# Patient Record
Sex: FEMALE | Race: OTHER
[De-identification: names, ages, dates, MRNs, and addresses within clinical notes are randomized per-mention and may not be internally consistent; named-entity substitution may affect disease eponyms.]

---

## 2017-04-21 ENCOUNTER — HOSPITAL ENCOUNTER (OUTPATIENT)
Dept: HOSPITAL 74 - JASU-SURG | Age: 52
Discharge: HOME | End: 2017-04-21
Attending: ORTHOPAEDIC SURGERY
Payer: COMMERCIAL

## 2017-04-21 VITALS — HEART RATE: 88 BPM | DIASTOLIC BLOOD PRESSURE: 80 MMHG | SYSTOLIC BLOOD PRESSURE: 130 MMHG

## 2017-04-21 VITALS — BODY MASS INDEX: 30.3 KG/M2

## 2017-04-21 VITALS — TEMPERATURE: 98 F

## 2017-04-21 DIAGNOSIS — M23.91: Primary | ICD-10-CM

## 2017-04-21 LAB
APPEARANCE UR: CLEAR
BILIRUB UR STRIP.AUTO-MCNC: NEGATIVE MG/DL
COLOR UR: (no result)
KETONES UR QL STRIP: NEGATIVE
LEUKOCYTE ESTERASE UR QL STRIP.AUTO: NEGATIVE
NITRITE UR QL STRIP: NEGATIVE
PH UR: 5 [PH] (ref 5–8)
PROT UR QL STRIP: NEGATIVE
PROT UR QL STRIP: NEGATIVE
RBC # UR STRIP: NEGATIVE /UL
SP GR UR: 1.02 (ref 1–1.03)
UROBILINOGEN UR STRIP-MCNC: NEGATIVE E.U./DL (ref 0.2–1)

## 2017-04-21 PROCEDURE — 0SBC4ZZ EXCISION OF RIGHT KNEE JOINT, PERCUTANEOUS ENDOSCOPIC APPROACH: ICD-10-PCS | Performed by: ORTHOPAEDIC SURGERY

## 2017-04-21 NOTE — OP
Operative Note





- Note:


Operative Date: 04/21/17


Pre-Operative Diagnosis: internal derangement right knee


Operation: arthroscopy right knee with debridement


Post-Operative Diagnosis: Same as Pre-op


Surgeon: Vaughn Reid


Anesthesia: General


Estimated Blood Loss (mls): 0


Operative Report Dictated: Yes

## 2017-04-21 NOTE — HP
Satellite [facility]





- Chief Complaint


Chief Complaint: right knee pain





- Past Medical History


Allergies/Adverse Reactions: 


 Allergies











Allergy/AdvReac Type Severity Reaction Status Date / Time


 


codeine [Codeine] Allergy Intermediate Rash Verified 04/11/17 12:50











...LMP: 01/15/17





- Current Medications


Current Medications: 


 Home Medications











 Medication  Instructions  Recorded


 


Insulin Glargine,Hum.rec.anlog 45 units SQ BID 03/25/13





[Lantus Solostar PEN -]  


 


Amlodipine Besylate [Norvasc -] 10 mg PO DAILY 12/30/14


 


Lisinopril [Prinivil -] 40 mg PO DAILY 12/30/14


 


Naproxen Sodium [Anaprox Ds] 2 tab PO BID 12/30/14


 


Fluticasone Propionate [Flovent 50 mcg IH BID 01/16/15





Diskus]  


 


Benzonatate [Tessalon Perle -] 200 mg PO TID #42 cap 12/30/16


 


Insulin Aspart [Novolog] 15 unit SQ TID 04/11/17


 


Levothyroxine [Synthroid -] 125 mcg PO DAILY 04/11/17


 


Oxycodone HCl/Acetaminophen 1 - 2 tab PO Q6H #40 tab MDD 8 04/21/17





[Percocet 5-325 mg Tablet -]  














Satellite Physical Exam





- Physical Examination


General Appearance: Well Nourished, Well Developed, Alert & Oriented x3


ENT: Clear


Lung: Normal air movement


Heart: Regular rate & rhythm


Extremities: Other (right knee- well healed surgical incision+ swelling, + ttp, 

decr rom, nvi  MRI + mt)


Neurological: Intact, Alert, Oriented





Satellite Impression/Plan





- Impression/Plan


Impression: right knee internal derangement


Operative Procedure: right knee arthroscopy


Date to be Performed: 04/21/17

## 2017-04-21 NOTE — OP
DATE OF OPERATION:  04/21/2017

 

PREOPERATIVE DIAGNOSIS:  Internal derangement, right knee.

 

POSTOPERATIVE DIAGNOSIS:  Internal derangement, right knee.

 

PROCEDURE:  Arthroscopy, right knee, with debridement.  

 

SURGICAL ATTENDING:  Vaughn Reid MD

 

ANESTHESIA:  General with LMA.

 

CLOSURE:  4-0 nylon.  

 

COMPLICATIONS:  None.

 

CONDITION:  To recovery room in stable condition.

 

DESCRIPTION OF OPERATIVE PROCEDURE:  The patient was taken to the operating room on

April 21, 2017.  General anesthesia with LMA was administered by the

anesthesiologist.  IV Kefzol was administered prophylactically prior to the case. 

Right lower extremity was prepped and draped in the usual sterile fashion. 

Superolateral, mediolateral, infrapatellar portal sites were infiltrated with 1%

Xylocaine with epinephrine.  Superolateral portal was made with a 15-blade followed

by a blunt trocar.  The knee was aspirated and inflated with cocktail 10 mL of 1%

Xylocaine, 10 mL of 0.5% Marcaine, and 20 mL of arthroscopic saline.  Medial and

lateral infrapatellar portals were then made with the 15-blade followed by a blunt

trocar.  The scope was placed in the lateral infrapatellar portal and up into the

suprapatellar pouch.  Pouch was visualized to be clean.  The medial and lateral

gutters were visualized to be clean.  The patella and trochlea, which were replaced

earlier with patellofemoral components, were probed.  They were found to be stable

throughout with no evidence of any loosening.  The patellar polyethylene was not

found to be worn more on one side or on the other.  There was no undue scar tissue

both medially and laterally.  With valgus stress underneath the medial compartment,

then, the medial meniscus was visualized and found to be intact, the medial femoral

condyle was run and found to be basically intact as was the medial tibial plateau. 

At 90 degrees, the ACL was visualized and probed, found to be intact.  There was some

anterior scar tissue running medially to laterally in the anterior aspect of the

knee.  This was debrided using the shaver.  In the figure 4 position, lateral

compartment was entered.  Lateral meniscus was visualized, probed, and found to be

intact.  Lateral femoral condyle was run and found to be intact, as was the lateral

tibial plateau.  The knee was then irrigated out with copious amounts of irrigation. 

The portals were closed using 4-0 nylon.  A sterile pressure dressing was placed over

the knee.  Patient awakened from anesthesia and transferred to recovery in stable

condition.  No complications.  Estimated blood loss negligible.

 

 

CHING [id number]

DD: 04/21/2017 12:01

DT: 04/21/2017 12:52

Job #:  [id number]

## 2018-04-02 ENCOUNTER — HOSPITAL ENCOUNTER (OUTPATIENT)
Dept: HOSPITAL 74 - FASU | Age: 53
LOS: 1 days | Discharge: HOME | End: 2018-04-03
Attending: ORTHOPAEDIC SURGERY
Payer: COMMERCIAL

## 2018-04-02 VITALS — BODY MASS INDEX: 29.9 KG/M2

## 2018-04-02 DIAGNOSIS — M22.2X1: Primary | ICD-10-CM

## 2018-04-02 PROCEDURE — 0SUC09C SUPPLEMENT RIGHT KNEE JOINT WITH LINER, PATELLAR SURFACE, OPEN APPROACH: ICD-10-PCS | Performed by: ORTHOPAEDIC SURGERY

## 2018-04-02 PROCEDURE — 0QQD0ZZ REPAIR RIGHT PATELLA, OPEN APPROACH: ICD-10-PCS | Performed by: ORTHOPAEDIC SURGERY

## 2018-04-02 RX ADMIN — INSULIN DETEMIR SCH UNITS: 100 INJECTION, SOLUTION SUBCUTANEOUS at 17:31

## 2018-04-02 RX ADMIN — INSULIN ASPART SCH UNITS: 100 INJECTION, SOLUTION INTRAVENOUS; SUBCUTANEOUS at 17:31

## 2018-04-02 RX ADMIN — CEFAZOLIN SODIUM SCH MLS/HR: 2 SOLUTION INTRAVENOUS at 20:35

## 2018-04-02 RX ADMIN — DOCUSATE SODIUM,SENNOSIDES SCH TABLET: 50; 8.6 TABLET, FILM COATED ORAL at 21:20

## 2018-04-02 RX ADMIN — INSULIN ASPART SCH: 100 INJECTION, SOLUTION INTRAVENOUS; SUBCUTANEOUS at 17:33

## 2018-04-02 NOTE — SURG
Surgery First Assist Note


First Assist: Ashlyn nAn PA-C


Date of Service: 04/02/18


Diagnosis: 





right patellofemoral pain with prosthesis


Procedure: 





Right lateral release with patelloplasty


I was present for the entirety of the operative procedure. For further detail, 

please refer to operative report.








Visit type





- Case Type


Case Type: Scheduled Admission





- Emergency


Emergency Visit: No





- New patient


This patient is new to me today: Yes


Date on this admission: 04/02/18

## 2018-04-02 NOTE — OP
Operative Note





- Note:


Operative Date: 04/02/18


Pre-Operative Diagnosis: right patellofemoral pain with prosthesis


Operation: Right knee lateral release with patelloplasty


Post-Operative Diagnosis: Same as Pre-op


Surgeon: Roger Domínguez


Assistant: Ashlyn Ann


Anesthesiologist/CRNA: Chase López


Anesthesia: Local (block)


Specimens Removed: lateral boarder of patella


Estimated Blood Loss (mls): 50


Drains & Tubes with Location: hemavac right knee


Fluid Volume Replaced (mls): 400


Operative Report Dictated: Yes

## 2018-04-03 VITALS — SYSTOLIC BLOOD PRESSURE: 138 MMHG | DIASTOLIC BLOOD PRESSURE: 65 MMHG | TEMPERATURE: 98.5 F | HEART RATE: 98 BPM

## 2018-04-03 LAB
ANION GAP SERPL CALC-SCNC: 7 MMOL/L (ref 8–16)
BUN SERPL-MCNC: 16 MG/DL (ref 7–18)
CALCIUM SERPL-MCNC: 9.2 MG/DL (ref 8.4–10.2)
CHLORIDE SERPL-SCNC: 103 MMOL/L (ref 98–107)
CO2 SERPL-SCNC: 25 MMOL/L (ref 22–28)
CREAT SERPL-MCNC: 0.5 MG/DL (ref 0.6–1.3)
DEPRECATED RDW RBC AUTO: 13.6 % (ref 11.6–15.6)
GLUCOSE SERPL-MCNC: 252 MG/DL (ref 74–106)
HCT VFR BLD CALC: 37.2 % (ref 32.4–45.2)
HGB BLD-MCNC: 13.1 GM/DL (ref 10.7–15.3)
MCH RBC QN AUTO: 31.8 PG (ref 25.7–33.7)
MCHC RBC AUTO-ENTMCNC: 35.1 G/DL (ref 32–36)
MCV RBC: 90.7 FL (ref 80–96)
PLATELET # BLD AUTO: 206 K/MM3 (ref 134–434)
PMV BLD: 11.7 FL (ref 7.5–11.1)
POTASSIUM SERPLBLD-SCNC: 4.2 MMOL/L (ref 3.5–5.1)
RBC # BLD AUTO: 4.1 M/MM3 (ref 3.6–5.2)
SODIUM SERPL-SCNC: 135 MMOL/L (ref 136–145)
WBC # BLD AUTO: 13.7 K/MM3 (ref 4–10.8)

## 2018-04-03 RX ADMIN — INSULIN DETEMIR SCH UNITS: 100 INJECTION, SOLUTION SUBCUTANEOUS at 06:44

## 2018-04-03 RX ADMIN — INSULIN ASPART SCH UNITS: 100 INJECTION, SOLUTION INTRAVENOUS; SUBCUTANEOUS at 11:38

## 2018-04-03 RX ADMIN — CEFAZOLIN SODIUM SCH MLS/HR: 2 SOLUTION INTRAVENOUS at 03:06

## 2018-04-03 RX ADMIN — INSULIN ASPART SCH UNITS: 100 INJECTION, SOLUTION INTRAVENOUS; SUBCUTANEOUS at 08:11

## 2018-04-03 RX ADMIN — DOCUSATE SODIUM,SENNOSIDES SCH: 50; 8.6 TABLET, FILM COATED ORAL at 09:31

## 2018-04-03 RX ADMIN — INSULIN ASPART SCH UNITS: 100 INJECTION, SOLUTION INTRAVENOUS; SUBCUTANEOUS at 08:12

## 2018-04-03 NOTE — PN
Progress Note (short form)





- Note


Progress Note: 





52F POD1 s/p open lateral retinacular release arthroplasty under spinal 

anesthetic with peripheral nerve blocks for post ooperative pain relief.


Pt states that pain is well controlled, reports some nausea earlier today that 

has improved.


AVSS. Motor and sensory function intact in bilateral lower extremities.


Continue current regimen.

## 2018-04-03 NOTE — OP
DATE OF OPERATION:  04/02/2018

 

PREOPERATIVE DIAGNOSIS:  Right knee patellofemoral maltracking.  

 

POSTOPERATIVE DIAGNOSIS:  Right knee patellofemoral maltracking.

 

OPERATION PERFORMED:  Right knee open lateral release and arthroplasty of the 
patella

without prosthesis.  

 

SURGEON:  Roger Domínguez MD

 

ASSISTANT:  PA.

 

TYPE OF ANESTHESIA:  General

 

DISPOSITION:  Patient was returned to the recovery room in stable condition.

 

DRAINS PLACED:  One Hemovac.

 

ESTIMATED BLOOD LOSS:  Less than 50 mL.

 

TOTAL TOURNIQUET TIME:  145 minutes.  

 

FINDINGS:  Lateral tracking patella.  

 

INDICATIONS FOR THE PROCEDURE:  Patient failed non-operative treatment for a

laterally tracking and tilted patella after a patellofemoral replacement and was

indicated for a lateral release and arthroplasty of the patella without the

prosthesis removing the lateral bone.  Preoperatively in the waiting area as 
well as

the office, I had a long discussion with the patient in English per her 
preference. 

I did discuss this with her regarding the plan, expected outcome, and the

risks, benefits, and alternatives of surgery.  The risks include, but are not 
limited

to, infection which may require future surgery and removal of her implants, 
bleeding

which may require a transfusion, damage to nerves, arteries, veins, tendons, 
muscles,

and other adjacent structures leading to possible numbness, weakness, decreased

function, and/or possible need for surgical repair.  Also discussed was the

possibility of implant loosening, stiffness, need for extensive therapy and 
need for

revision surgery for a variety of reasons.  We also discussed blood clots and 
other

medical complications.  This was discussed at length, and consent was obtained.
  We

also discussed that there is a chance that this may not resolve all her 
problems.  

 

PROCEDURE IN DETAIL:  Patient was taken to the operating room and placed on the

operating table in the supine position.  Following induction of anesthesia, a

well-padded tourniquet was placed high in the right thigh, and the right lower

extremity was prepped and draped in a sterile fashion.  Time-out was performed 
to

confirm the correct side.  We verified that side was marked and confirmed the 
correct

patient and procedure to be performed as well as that the patient received the

appropriate preoperative antibiotics, and had a compression device on the

non-operative leg.  

 

The tourniquet was elevated followed by a midline incision.  We did a lateral 

parapatellar arthrotomy and a lateral release, and using a saw, removed the

overhanging lateral patella.  The inspection of the patellar component showed 
it to

be in appropriate position as well as the trochlear component.  Both components 
were

well fixed with no excessive wear on the on the patellar component.  

 

At this point, with the full lateral release completed and

hemostasis obtained, I inspected the rest of the knee, which showed some grade 2

changes in the lateral distal femur, but the patella was tracking centrally 
with no

areas of impingement of the lateral facet on the lateral femur.  The patella 
tracked

centrally, and there was good stability, soft tissue tension, and range of 
motion.  

 

The wound was copiously irrigated.  Local anesthetic was injected.  Then, 2-0 
Vicryl

was used to close deep layer leaving the arthrotomy and lateral release open.  
We

then used 2-0 Vicryl and staples for the skin.  Deep drain had been placed.  A 
dry

sterile compressive dressing was placed on the patient, and she was returned to 
the

recovery room in stable condition.

 

WOUND CLASSIFICATION:  Clean.

 

COMPLICATIONS:  None.

 

SPECIMENS:  None.

 

 

CHING [id number]

DD: 04/02/2018 20:19

DT: 04/03/2018 08:00

Job #:  [id number]

MTDD

## 2018-04-03 NOTE — PN
Progress Note (short form)





- Note


Progress Note: 





POD#1 right knee lateral release Patient seen and examined at bedside. Patient c

/o nausea with one episode of vomiting this morning. She tolerated her diet 

last night and has been OOB to bathroom to void. She states her pain is 

controlled and she denies any CP, SOB, fever or chills.





 Vital Signs











 Period  Temp  Pulse  Resp  BP Sys/Aguilar  Pulse Ox


 


 Last 24 Hr  98.1 F-98.6 F    16-20  111-153/55-93  








 Vital Signs











Temp  98.5 F   04/03/18 06:00


 


Pulse  98 H  04/03/18 06:00


 


Resp  18   04/03/18 06:00


 


BP  138/65   04/03/18 06:00


 


Pulse Ox  97   04/03/18 07:12








 Intake & Output











 04/02/18 04/02/18 04/03/18





 11:59 23:59 11:59


 


Intake Total 400 1100 650


 


Output Total  340 


 


Balance 400 760 650


 


Weight 186 lb  


 


Intake:   


 


   400 300


 


    Normal Saline - 1,000 ml   300





    @ 75 mls/hr IV ASDIR ANNY   





    Rx#:[id number]   


 


  IVPB   100


 


  Oral  700 250


 


Output:   


 


  Drainage  100 


 


    Right Knee  60 


 


  Urine  240 


 


Other:   


 


  Voiding Method  Toilet Toilet


 


  # Unmeasured Voids   


 


    Void  1 3


 


  Height 5 ft 6 in  


 


  Body Mass Index (BMI) 29.9  


 


  Weight Measurement Method Standing Scale  








PE:


A&Ox3, NAD


unlabored resp on RA


right LE, Knee with moderated effusion appropriate to status, dressing with 

some saturation at central portion but intact. Surrounding tissue intact with 

no evidence of tracking erythema or ecchymosis. ROM from 10-40 degrees. Hemavac 

drain removed with tip fully intact.  serosanginous d/c in drain. Drain site 

clean and dry with no d/c or active bleeding, pressure dressing applied.


B/L LE compartments soft, supple and non-tender, NVID with +2 pedal pulses











Problem List





- Problems


(1) Knee pain


Assessment/Plan: 


POD #1 right knee lateral release doing well.





Plan:


1) OOB with PT today WBAT with walker


2) keep dressing clean and dry


3) DVT prophylaxis with B/l teds, scds and Aspirin 81mg BID


4) d/c home today





evaluation and plan discussed with Dr Domínguez


Code(s): M25.569 - PAIN IN UNSPECIFIED KNEE   


Qualifiers: 


   Laterality: right   Qualified Code(s): M25.561 - Pain in right knee

## 2018-10-28 ENCOUNTER — HOSPITAL ENCOUNTER (EMERGENCY)
Dept: HOSPITAL 74 - JER | Age: 53
Discharge: HOME | End: 2018-10-28
Payer: COMMERCIAL

## 2018-10-28 VITALS — DIASTOLIC BLOOD PRESSURE: 72 MMHG | HEART RATE: 76 BPM | SYSTOLIC BLOOD PRESSURE: 119 MMHG

## 2018-10-28 VITALS — BODY MASS INDEX: 29.8 KG/M2

## 2018-10-28 VITALS — TEMPERATURE: 98.5 F

## 2018-10-28 DIAGNOSIS — Z96.651: ICD-10-CM

## 2018-10-28 DIAGNOSIS — Z79.4: ICD-10-CM

## 2018-10-28 DIAGNOSIS — I25.10: ICD-10-CM

## 2018-10-28 DIAGNOSIS — I10: ICD-10-CM

## 2018-10-28 DIAGNOSIS — E11.9: ICD-10-CM

## 2018-10-28 DIAGNOSIS — Z98.61: ICD-10-CM

## 2018-10-28 DIAGNOSIS — E03.9: ICD-10-CM

## 2018-10-28 DIAGNOSIS — I25.2: ICD-10-CM

## 2018-10-28 DIAGNOSIS — M54.32: Primary | ICD-10-CM

## 2018-10-28 DIAGNOSIS — E78.00: ICD-10-CM

## 2018-10-28 LAB
APPEARANCE UR: CLEAR
BILIRUB UR STRIP.AUTO-MCNC: NEGATIVE MG/DL
COLOR UR: (no result)
KETONES UR QL STRIP: NEGATIVE
LEUKOCYTE ESTERASE UR QL STRIP.AUTO: NEGATIVE
NITRITE UR QL STRIP: NEGATIVE
PH UR: 6 [PH] (ref 5–8)
PROT UR QL STRIP: (no result)
PROT UR QL STRIP: NEGATIVE
SP GR UR: 1.03 (ref 1.01–1.03)
UROBILINOGEN UR STRIP-MCNC: NEGATIVE MG/DL (ref 0.2–1)

## 2018-10-28 PROCEDURE — 3E0233Z INTRODUCTION OF ANTI-INFLAMMATORY INTO MUSCLE, PERCUTANEOUS APPROACH: ICD-10-PCS
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
## 2019-02-05 ENCOUNTER — HOSPITAL ENCOUNTER (EMERGENCY)
Dept: HOSPITAL 74 - JER | Age: 54
Discharge: HOME | End: 2019-02-05
Payer: COMMERCIAL

## 2019-02-05 VITALS — TEMPERATURE: 97.9 F | DIASTOLIC BLOOD PRESSURE: 72 MMHG | SYSTOLIC BLOOD PRESSURE: 136 MMHG | HEART RATE: 83 BPM

## 2019-02-05 VITALS — BODY MASS INDEX: 29.8 KG/M2

## 2019-02-05 DIAGNOSIS — E03.9: ICD-10-CM

## 2019-02-05 DIAGNOSIS — Z79.4: ICD-10-CM

## 2019-02-05 DIAGNOSIS — E11.9: ICD-10-CM

## 2019-02-05 DIAGNOSIS — J40: Primary | ICD-10-CM

## 2019-02-05 DIAGNOSIS — I10: ICD-10-CM

## 2019-02-05 DIAGNOSIS — E78.00: ICD-10-CM

## 2019-03-26 ENCOUNTER — HOSPITAL ENCOUNTER (EMERGENCY)
Dept: HOSPITAL 74 - JER | Age: 54
LOS: 1 days | Discharge: HOME | End: 2019-03-27
Payer: COMMERCIAL

## 2019-03-26 VITALS — DIASTOLIC BLOOD PRESSURE: 65 MMHG | HEART RATE: 79 BPM | SYSTOLIC BLOOD PRESSURE: 154 MMHG | TEMPERATURE: 98 F

## 2019-03-26 VITALS — BODY MASS INDEX: 29.8 KG/M2

## 2019-03-26 DIAGNOSIS — Z79.4: ICD-10-CM

## 2019-03-26 DIAGNOSIS — M25.561: Primary | ICD-10-CM

## 2019-03-26 DIAGNOSIS — I25.2: ICD-10-CM

## 2019-03-26 DIAGNOSIS — E11.9: ICD-10-CM

## 2019-03-26 DIAGNOSIS — I25.10: ICD-10-CM

## 2019-03-26 DIAGNOSIS — E78.00: ICD-10-CM

## 2019-03-26 DIAGNOSIS — Z98.61: ICD-10-CM

## 2019-03-26 DIAGNOSIS — I10: ICD-10-CM

## 2019-03-26 DIAGNOSIS — Z96.651: ICD-10-CM

## 2019-03-26 PROCEDURE — 3E0233Z INTRODUCTION OF ANTI-INFLAMMATORY INTO MUSCLE, PERCUTANEOUS APPROACH: ICD-10-PCS
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
## 2019-03-27 NOTE — PDOC
*Physical Exam





- Vital Signs


 Last Vital Signs











Temp Pulse Resp BP Pulse Ox


 


 98 F   79   20   154/65   95 


 


 03/26/19 23:52  03/26/19 23:52  03/26/19 23:52  03/26/19 23:52  03/26/19 23:52














Medical Decision Making





- Medical Decision Making





03/27/19 01:41


Patient seen by the advanced practice provider under my direct supervision. 

Ancillary testing reviewed as necessary.


I agree with plan as outlined by the advanced practice provider.





*DC/Admit/Observation/Transfer


Diagnosis at time of Disposition: 


Knee pain


Qualifiers:


 Chronicity: acute Laterality: right Qualified Code(s): M25.561 - Pain in right 

knee








- Discharge Dispostion


Disposition: HOME





- Referrals


Referrals: 


Sanchez Villegas MD, MD [Primary Care Provider] - 





- Patient Instructions





- Post Discharge Activity

## 2019-09-09 ENCOUNTER — HOSPITAL ENCOUNTER (OUTPATIENT)
Dept: HOSPITAL 74 - JER | Age: 54
Setting detail: OBSERVATION
LOS: 1 days | Discharge: HOME | End: 2019-09-10
Attending: FAMILY MEDICINE | Admitting: FAMILY MEDICINE
Payer: COMMERCIAL

## 2019-09-09 VITALS — BODY MASS INDEX: 32 KG/M2

## 2019-09-09 DIAGNOSIS — Z98.61: ICD-10-CM

## 2019-09-09 DIAGNOSIS — R07.89: Primary | ICD-10-CM

## 2019-09-09 DIAGNOSIS — Z79.82: ICD-10-CM

## 2019-09-09 DIAGNOSIS — E78.5: ICD-10-CM

## 2019-09-09 DIAGNOSIS — Z79.4: ICD-10-CM

## 2019-09-09 DIAGNOSIS — E87.0: ICD-10-CM

## 2019-09-09 DIAGNOSIS — K21.9: ICD-10-CM

## 2019-09-09 DIAGNOSIS — E03.9: ICD-10-CM

## 2019-09-09 DIAGNOSIS — E11.9: ICD-10-CM

## 2019-09-09 DIAGNOSIS — E66.9: ICD-10-CM

## 2019-09-09 DIAGNOSIS — I25.2: ICD-10-CM

## 2019-09-09 DIAGNOSIS — I25.10: ICD-10-CM

## 2019-09-09 DIAGNOSIS — Z88.5: ICD-10-CM

## 2019-09-09 DIAGNOSIS — I10: ICD-10-CM

## 2019-09-09 LAB
ALBUMIN SERPL-MCNC: 4 G/DL (ref 3.4–5)
ALP SERPL-CCNC: 129 U/L (ref 45–117)
ALT SERPL-CCNC: 60 U/L (ref 13–61)
ANION GAP SERPL CALC-SCNC: 10 MMOL/L (ref 8–16)
APPEARANCE UR: CLEAR
APTT BLD: 32.3 SECONDS (ref 25.2–36.5)
AST SERPL-CCNC: 38 U/L (ref 15–37)
BASOPHILS # BLD: 1.1 % (ref 0–2)
BILIRUB SERPL-MCNC: 0.7 MG/DL (ref 0.2–1)
BILIRUB UR STRIP.AUTO-MCNC: NEGATIVE MG/DL
BNP SERPL-MCNC: 5.4 PG/ML (ref 5–125)
BUN SERPL-MCNC: 16.8 MG/DL (ref 7–18)
CALCIUM SERPL-MCNC: 9.3 MG/DL (ref 8.5–10.1)
CHLORIDE SERPL-SCNC: 111 MMOL/L (ref 98–107)
CO2 SERPL-SCNC: 28 MMOL/L (ref 21–32)
COLOR UR: YELLOW
CREAT SERPL-MCNC: 0.5 MG/DL (ref 0.55–1.3)
DEPRECATED RDW RBC AUTO: 14.2 % (ref 11.6–15.6)
EOSINOPHIL # BLD: 1.7 % (ref 0–4.5)
GLUCOSE SERPL-MCNC: 92 MG/DL (ref 74–106)
HCT VFR BLD CALC: 39.5 % (ref 32.4–45.2)
HGB BLD-MCNC: 13.5 GM/DL (ref 10.7–15.3)
INR BLD: 1 (ref 0.83–1.09)
KETONES UR QL STRIP: NEGATIVE
LEUKOCYTE ESTERASE UR QL STRIP.AUTO: NEGATIVE
LIPASE SERPL-CCNC: 136 U/L (ref 73–393)
LYMPHOCYTES # BLD: 36.9 % (ref 8–40)
MCH RBC QN AUTO: 31.4 PG (ref 25.7–33.7)
MCHC RBC AUTO-ENTMCNC: 34.1 G/DL (ref 32–36)
MCV RBC: 92.1 FL (ref 80–96)
MONOCYTES # BLD AUTO: 7.7 % (ref 3.8–10.2)
NEUTROPHILS # BLD: 52.6 % (ref 42.8–82.8)
NITRITE UR QL STRIP: NEGATIVE
PH UR: 7 [PH] (ref 5–8)
PLATELET # BLD AUTO: 191 K/MM3 (ref 134–434)
PMV BLD: 11 FL (ref 7.5–11.1)
POTASSIUM SERPLBLD-SCNC: 4.1 MMOL/L (ref 3.5–5.1)
PROT SERPL-MCNC: 7.8 G/DL (ref 6.4–8.2)
PROT UR QL STRIP: NEGATIVE
PROT UR QL STRIP: NEGATIVE
PT PNL PPP: 11.8 SEC (ref 9.7–13)
RBC # BLD AUTO: 4.28 M/MM3 (ref 3.6–5.2)
SODIUM SERPL-SCNC: 149 MMOL/L (ref 136–145)
SP GR UR: 1.03 (ref 1.01–1.03)
UROBILINOGEN UR STRIP-MCNC: 1 MG/DL (ref 0.2–1)
WBC # BLD AUTO: 6 K/MM3 (ref 4–10)

## 2019-09-09 PROCEDURE — 3E033GC INTRODUCTION OF OTHER THERAPEUTIC SUBSTANCE INTO PERIPHERAL VEIN, PERCUTANEOUS APPROACH: ICD-10-PCS | Performed by: FAMILY MEDICINE

## 2019-09-09 PROCEDURE — 3E013VG INTRODUCTION OF INSULIN INTO SUBCUTANEOUS TISSUE, PERCUTANEOUS APPROACH: ICD-10-PCS | Performed by: FAMILY MEDICINE

## 2019-09-09 PROCEDURE — 3E013GC INTRODUCTION OF OTHER THERAPEUTIC SUBSTANCE INTO SUBCUTANEOUS TISSUE, PERCUTANEOUS APPROACH: ICD-10-PCS | Performed by: FAMILY MEDICINE

## 2019-09-09 PROCEDURE — A9502 TC99M TETROFOSMIN: HCPCS

## 2019-09-09 PROCEDURE — 3E0337Z INTRODUCTION OF ELECTROLYTIC AND WATER BALANCE SUBSTANCE INTO PERIPHERAL VEIN, PERCUTANEOUS APPROACH: ICD-10-PCS | Performed by: FAMILY MEDICINE

## 2019-09-09 PROCEDURE — G0378 HOSPITAL OBSERVATION PER HR: HCPCS

## 2019-09-09 RX ADMIN — INSULIN DETEMIR SCH UNIT: 100 INJECTION, SOLUTION SUBCUTANEOUS at 09:46

## 2019-09-09 RX ADMIN — INSULIN DETEMIR SCH UNIT: 100 INJECTION, SOLUTION SUBCUTANEOUS at 21:50

## 2019-09-09 RX ADMIN — INSULIN ASPART SCH: 100 INJECTION, SOLUTION INTRAVENOUS; SUBCUTANEOUS at 12:23

## 2019-09-09 RX ADMIN — INSULIN ASPART SCH: 100 INJECTION, SOLUTION INTRAVENOUS; SUBCUTANEOUS at 06:32

## 2019-09-09 RX ADMIN — LOSARTAN POTASSIUM SCH MG: 50 TABLET, FILM COATED ORAL at 09:42

## 2019-09-09 RX ADMIN — AMLODIPINE BESYLATE SCH MG: 10 TABLET ORAL at 09:42

## 2019-09-09 RX ADMIN — INSULIN ASPART SCH UNIT: 100 INJECTION, SOLUTION INTRAVENOUS; SUBCUTANEOUS at 16:29

## 2019-09-09 RX ADMIN — HEPARIN SODIUM SCH UNIT: 5000 INJECTION, SOLUTION INTRAVENOUS; SUBCUTANEOUS at 09:42

## 2019-09-09 RX ADMIN — HEPARIN SODIUM SCH UNIT: 5000 INJECTION, SOLUTION INTRAVENOUS; SUBCUTANEOUS at 21:50

## 2019-09-09 NOTE — EKG
Test Reason : 

Blood Pressure : ***/*** mmHG

Vent. Rate : 075 BPM     Atrial Rate : 075 BPM

   P-R Int : 140 ms          QRS Dur : 094 ms

    QT Int : 420 ms       P-R-T Axes : 054 027 058 degrees

   QTc Int : 469 ms

 

NORMAL SINUS RHYTHM

NONSPECIFIC T WAVE ABNORMALITY

WHEN COMPARED WITH ECG OF 26-SEP-2016 23:02,

T WAVE VARIATION

Confirmed by MARY WALDROP MD ([id number]) on 9/9/2019 11:26:00 AM

 

Referred By:             Confirmed By:MARY WALDROP MD

## 2019-09-09 NOTE — PN
Progress Note, Physician


Chief Complaint: 





patient seen and examined complaining of chest pain on exertion 


has h/o cardiac cath about 8 yrs ago, DM,





- Current Medication List


Current Medications: 


Active Medications





Amlodipine Besylate (Norvasc -)  10 mg PO DAILY [facility]


   Last Admin: 09/09/19 09:42 Dose:  10 mg


Atorvastatin Calcium (Lipitor -)  10 mg PO HS [facility]


Heparin Sodium (Porcine) (Heparin -)  5,000 unit SQ BID [facility]


   Last Admin: 09/09/19 09:42 Dose:  5,000 unit


Insulin Aspart (Novolog Vial Sliding Scale -)  1 vial SQ TIDAC [facility]; Protocol


   Last Admin: 09/09/19 06:32 Dose:  Not Given


Insulin Detemir (Levemir Vial)  45 units SQ BID@0700,2200 [facility]


   Last Admin: 09/09/19 09:46 Dose:  45 unit


Levothyroxine Sodium (Synthroid -)  150 mcg PO DAILY@0700 [facility]


Losartan Potassium (Cozaar -)  100 mg PO DAILY [facility]


   Last Admin: 09/09/19 09:42 Dose:  100 mg











- Objective


Vital Signs: 


 Vital Signs











Temperature  98.3 F   09/09/19 08:55


 


Pulse Rate  76   09/09/19 08:55


 


Respiratory Rate  20   09/09/19 08:55


 


Blood Pressure  129/61   09/09/19 08:55


 


O2 Sat by Pulse Oximetry (%)  100   09/09/19 00:37











Constitutional: Yes: Calm


Cardiovascular: Yes: Regular Rate and Rhythm, S1, S2


Respiratory: Yes: CTA Bilaterally


Gastrointestinal: Yes: Normal Bowel Sounds, Soft


Edema: No


Neurological: Yes: Alert, Oriented


Labs: 


 CBC, BMP





 09/09/19 00:56 





 09/09/19 00:47 





 INR, PTT











INR  1.00  (0.83-1.09)   09/09/19  00:56    














Problem List





- Problems


(1) CAD (coronary artery disease)


Assessment/Plan: 


stress test 


cardiology eval


CE 2 sets negative


Code(s): I25.10 - ATHSCL HEART DISEASE OF NATIVE CORONARY ARTERY W/O ANG PCTRS 

  





(2) Diabetes


Assessment/Plan: 


insulin


bgm


hgba1c


endocrine


Code(s): E11.9 - TYPE 2 DIABETES MELLITUS WITHOUT COMPLICATIONS   


Qualifiers: 


   Diabetes mellitus type: type 2 





(3) HLD (hyperlipidemia)


Assessment/Plan: 


lipid panel


 LDL < 70


Code(s): E78.5 - HYPERLIPIDEMIA, UNSPECIFIED   





(4) HTN (hypertension)


Assessment/Plan: 


norvasc and cozaar


Code(s): I10 - ESSENTIAL (PRIMARY) HYPERTENSION   





(5) Hypothyroidism


Assessment/Plan: 


tsh noted dose increased


Code(s): E03.9 - HYPOTHYROIDISM, UNSPECIFIED

## 2019-09-09 NOTE — HP
CHIEF COMPLAINT: chest pain





PCP: Dr. Ortiz 





HISTORY OF PRESENT ILLNESS: 





54 year old female with PMHx of HTN, HLD, DM, CAD s/p MI with cath x2 (no 

stenting), GERD, and hypothyroidism arrived to  ED for chest pain x5 days. 

Patient reported her chest pain is left sided, pressure, intermittent, non-

radiating, aggravated by exertion, pain of 7/10. Patient also complain of pain 

neck, dizziness,  mild nausea without vomiting. Patient denies SOB, headache, 

no diarrhea or vomiting. 





ER course was notable for:


(1)EKG: NSR, Trops X1 negative 


(2) given Tylenol 975 mg,  mg , Nitro SL, Famotidine 20 mg, 1L IV fluids 





Recent Travel: no





PAST MEDICAL HISTORY: h/o MI, 2007 & 2011-cardiac cath X2 NO STENT, DM, HTN, HLD

, Hypothyroidism 





PAST SURGICAL HISTORY: Right knee Sx x3, Tummy Tuck 2010





Social History:


Smoking:no


Alcohol:no


Drugs:no





Family History: Mother: stroke, MI;  Sister: Pulmonary HTN 


Allergies: codeine phosphate [From Tylenol-Codeine #3] Allergy (Intermediate, 

Verified 09/09/19 00:37)


 Rash, pt tolerates percocet 








HOME MEDICATIONS:


 Home Medications











 Medication  Instructions  Recorded


 


Amlodipine Besylate [Norvasc -] 10 mg PO DAILY 12/30/14


 


Fluticasone Propionate [Flovent 50 mcg IH BID PRN 01/16/15





Diskus]  


 


Insulin Aspart [Novolog] 15 unit SQ TID 04/11/17


 


Levothyroxine [Synthroid -] 125 mcg PO DAILY 04/11/17


 


Aspirin [Ecotrin] 81 mg PO DAILY 03/29/18


 


Atorvastatin Ca [Lipitor] 10 mg PO HS 03/29/18


 


Cholecalciferol (Vitamin D3) 5,000 unit PO DAILY 03/29/18





[Vitamin D3]  


 


Ibuprofen 600 mg PO ASDIR PRN 03/29/18


 


Insulin Detemir [Levemir Flextouch] 45 unit SQ BID 03/29/18


 


Losartan Potassium 100 mg PO DAILY 03/29/18


 


oxyCODONE HCL [Roxicodone -] 5 mg PO Q4H PRN #20 tablet MDD 6 04/02/18


 


Cyclobenzaprine HCl [Flexeril -] 10 mg PO BID #10 tablet 10/28/18


 


Azithromycin 250 mg PO DAILY #6 tablet 02/05/19


 


Ibuprofen 600 mg PO QID PRN #20 tablet 03/27/19








REVIEW OF SYSTEMS


General: absent: fever, chills, generalized weakness.


HEENT: absent: sore throat, rhinorrhea, ear pain.


Cardiovascular: chest pain, absent: palpitations, syncope, diaphoresis.


Respiratory: absent: shortness of breath, cough, sputum production, hemoptysis.


Gastrointestinal: absent:  abdominal pain, nausea, vomiting, diarrhea, 

constipation, blood in stool.


Genitourinary: absent: dysuria, increased urinary frequency, hematuria, urinary 

incontinence, flank pain.


Back: + neck pain absent:  back pain


Musculoskeletal: absent: joint pain, muscle pain, joint swelling.


Neurological:  + dizziness, headache absent: numbness, tingling, weakness. 


Integumentary: absent: rash, laceration, abrasion.














PHYSICAL EXAMINATION


 Vital Signs - 24 hr











  09/09/19





  00:37


 


Temperature 98.0 F


 


Pulse Rate 79


 


Respiratory 18





Rate 


 


Blood Pressure 136/72


 


O2 Sat by Pulse 100





Oximetry (%) 











GENERAL: Awake, alert, and fully oriented, in no acute distress.


HEENT: NC/AT, EOMI, PERRLA, No JVD


LUNGS: Breath sounds equal, clear to auscultation bilaterally. No wheezes, and 

no crackles. 


HEART: Regular rate and rhythm, normal S1 and S2 without murmur, rub or gallop.


ABDOMEN: Soft, nontender, not distended, normoactive bowel sounds, no guarding, 

no rebound, no masses. 


MUSCULOSKELETAL: Normal range of motion at all joints. No bony deformities or 

tenderness. No CVA tenderness.


Extremity: no edema 


NEUROLOGICAL:  Cranial nerves II-XII intact. Normal speech


PSYCHIATRIC: Cooperative. Good eye contact. Appropriate mood and affect.


SKIN: Warm, dry


 Laboratory Results - last 24 hr











  09/09/19 09/09/19 09/09/19





  00:47 00:56 00:56


 


WBC   6.0 


 


RBC   4.28 


 


Hgb   13.5 


 


Hct   39.5 


 


MCV   92.1 


 


MCH   31.4 


 


MCHC   34.1 


 


RDW   14.2 


 


Plt Count   191 


 


MPV   11.0 


 


Absolute Neuts (auto)   3.2 


 


Neutrophils %   52.6 


 


Lymphocytes %   36.9 


 


Monocytes %   7.7 


 


Eosinophils %   1.7 


 


Basophils %   1.1 


 


Nucleated RBC %   0 


 


PT with INR    11.80


 


INR    1.00


 


PTT (Actin FS)    32.3


 


Sodium  149 H  


 


Potassium  4.1  


 


Chloride  111 H  


 


Carbon Dioxide  28  


 


Anion Gap  10  


 


BUN  16.8  


 


Creatinine  0.5 L  


 


Est GFR (CKD-EPI)AfAm  126.28  


 


Est GFR (CKD-EPI)NonAf  108.96  


 


Random Glucose  92  


 


Calcium  9.3  


 


Total Bilirubin  0.7  


 


AST  38 H  


 


ALT  60  


 


Alkaline Phosphatase  129 H  


 


Creatine Kinase  210 H  


 


Creatine Kinase Index  0.5  


 


CK-MB (CK-2)  1.2  


 


Troponin I  < 0.02  


 


B-Natriuretic Peptide  5.4  


 


Total Protein  7.8  


 


Albumin  4.0  


 


Lipase  136  


 


TSH  3.99 H  


 


Urine Color   


 


Urine Appearance   


 


Urine pH   


 


Ur Specific Gravity   


 


Urine Protein   


 


Urine Glucose (UA)   


 


Urine Ketones   


 


Urine Blood   


 


Urine Nitrite   


 


Urine Bilirubin   


 


Urine Urobilinogen   


 


Ur Leukocyte Esterase   














  09/09/19





  02:15


 


WBC 


 


RBC 


 


Hgb 


 


Hct 


 


MCV 


 


MCH 


 


MCHC 


 


RDW 


 


Plt Count 


 


MPV 


 


Absolute Neuts (auto) 


 


Neutrophils % 


 


Lymphocytes % 


 


Monocytes % 


 


Eosinophils % 


 


Basophils % 


 


Nucleated RBC % 


 


PT with INR 


 


INR 


 


PTT (Actin FS) 


 


Sodium 


 


Potassium 


 


Chloride 


 


Carbon Dioxide 


 


Anion Gap 


 


BUN 


 


Creatinine 


 


Est GFR (CKD-EPI)AfAm 


 


Est GFR (CKD-EPI)NonAf 


 


Random Glucose 


 


Calcium 


 


Total Bilirubin 


 


AST 


 


ALT 


 


Alkaline Phosphatase 


 


Creatine Kinase 


 


Creatine Kinase Index 


 


CK-MB (CK-2) 


 


Troponin I 


 


B-Natriuretic Peptide 


 


Total Protein 


 


Albumin 


 


Lipase 


 


TSH 


 


Urine Color  Yellow


 


Urine Appearance  Clear


 


Urine pH  7.0


 


Ur Specific Gravity  1.035


 


Urine Protein  Negative


 


Urine Glucose (UA)  Negative


 


Urine Ketones  Negative


 


Urine Blood  Negative


 


Urine Nitrite  Negative


 


Urine Bilirubin  Negative


 


Urine Urobilinogen  1.0


 


Ur Leukocyte Esterase  Negative











ASSESSMENT/PLAN:





54 year old female with PMHx of HTN, HLD, DM, CAD s/p MI with cath x2 (no 

stenting), GERD, and hypothyroidism presented to ED for chest pain x5 days. 


Patient reported her chest pain is left sided, pressure, intermittent, non-

radiating, aggravated by exertion, pain of 7/10. Patient also complain of pain 

neck, dizziness,  mild nausea. 








# Chest pain  r/o ACS 


- EKG: rate 75, regular rhythm, normal axis, normal intervals, , no 

acute ST changes


- CXR:  no infiltrated


- Given:  mg, Nitro SL X1 , Pepcid IV once, tylenol 975 mg PO x1


- Trops # 1 negative 


- pending second trop


- admit to tele obs 





# HTN / HLD, CAD 


-Amlodipine Besylate 10 mg PO DAILY


-Aspirin 81 mg PO DAILY


-Atorvastatin Ca 10 mg PO HS 


-Losartan Potassium 100 mg PO DAILY 





#Hypothyrodism 


- Levothyroxine 125 mcg PO DAILY 04/11/17 





# DM


- Monitor FSBS, coverage with Novolog sliding scale 


- [Levemir Flextouch] 45 unit SQ BID 




















Problem List





- Problem


(1) Chest pain, rule out acute myocardial infarction


Code(s): R07.9 - CHEST PAIN, UNSPECIFIED   





(2) Chest pain


Code(s): R07.9 - CHEST PAIN, UNSPECIFIED   





(3) HTN (hypertension)


Code(s): I10 - ESSENTIAL (PRIMARY) HYPERTENSION   





(4) HLD (hyperlipidemia)


Code(s): E78.5 - HYPERLIPIDEMIA, UNSPECIFIED   





(5) CAD (coronary artery disease)


Code(s): I25.10 - ATHSCL HEART DISEASE OF NATIVE CORONARY ARTERY W/O ANG PCTRS 

  





(6) Hypothyroidism


Code(s): E03.9 - HYPOTHYROIDISM, UNSPECIFIED   





(7) Diabetes


Code(s): E11.9 - TYPE 2 DIABETES MELLITUS WITHOUT COMPLICATIONS   





Visit type





- Emergency Visit


Emergency Visit: Yes


ED Registration Date: 09/09/19


Care time: The patient presented to the Emergency Department on the above date 

and was hospitalized for further evaluation of their emergent condition.





- New Patient


This patient is new to me today: Yes


Date on this admission: 09/09/19





- Critical Care


Critical Care patient: No

## 2019-09-09 NOTE — EKG
Test Reason : 

Blood Pressure : ***/*** mmHG

Vent. Rate : 073 BPM     Atrial Rate : 073 BPM

   P-R Int : 166 ms          QRS Dur : 100 ms

    QT Int : 424 ms       P-R-T Axes : 058 008 069 degrees

   QTc Int : 467 ms

 

NORMAL SINUS RHYTHM

NONSPECIFIC T WAVE ABNORMALITY

PROLONGED QT

ABNORMAL ECG

WHEN COMPARED WITH ECG OF 09-SEP-2019 00:26,

T WAVE VARIATION

Confirmed by MARY WALDROP MD ([id number]) on 9/9/2019 11:25:01 AM

 

Referred By:             Confirmed By:MARY WALDROP MD

## 2019-09-09 NOTE — CON.CARD
Consult


Consult Specialty:: Cardiology


Referred by:: Kinjal


Reason for Consultation:: chest pain





- History of Present Illness


Chief Complaint: chest pain


History of Present Illness: 


54 year old female with PMHx of HTN, HLD, DM, CAD s/p MI with cath x2 (no 

stenting), GERD, and hypothyroidism arrived to  ED for chest pain x5 days. 

Patient reported her chest pain is left sided, pressure, intermittent, non-

radiating, aggravated by exertion, pain of 7/10. Patient also complain of pain 

neck, dizziness,  mild nausea without vomiting. Patient denies SOB, headache, 

no diarrhea or vomiting. 





she is having her stress test today. 








- History Source


History Provided By: Patient, Medical Record





- Past Medical History


...LMP: 02/18/18





- Alcohol/Substance Use


Hx Alcohol Use: No





- Smoking History


Smoking history: Never smoked


Have you smoked in the past 12 months: No


Aproximately how many cigarettes per day: 0





Home Medications





- Allergies


Allergies/Adverse Reactions: 


 Allergies











Allergy/AdvReac Type Severity Reaction Status Date / Time


 


codeine phosphate Allergy Intermediate Rash Verified 09/09/19 00:37





[From Tylenol-Codeine #3]     














- Home Medications


Home Medications: 


Ambulatory Orders





Amlodipine Besylate [Norvasc -] 10 mg PO DAILY 12/30/14 


Fluticasone Propionate [Flovent Diskus] 50 mcg IH BID PRN 01/16/15 


Insulin Aspart [Novolog] 15 unit SQ TID 04/11/17 


Levothyroxine [Synthroid -] 125 mcg PO DAILY 04/11/17 


Aspirin [Ecotrin] 81 mg PO DAILY 03/29/18 


Atorvastatin Ca [Lipitor] 10 mg PO HS 03/29/18 


Cholecalciferol (Vitamin D3) [Vitamin D3] 5,000 unit PO DAILY 03/29/18 


Losartan Potassium 100 mg PO DAILY 03/29/18 


Insulin Glargine,Hum.rec.anlog [Lantus] 45 unit SQ BID 09/09/19 








Vital Signs: 


 Vital Signs











Temperature  98.3 F   09/09/19 08:55


 


Pulse Rate  76   09/09/19 08:55


 


Respiratory Rate  20   09/09/19 08:55


 


Blood Pressure  129/61   09/09/19 08:55


 


O2 Sat by Pulse Oximetry (%)  100   09/09/19 00:37











Constitutional: Yes: No Distress, Calm


Eyes: Yes: Conjunctiva Clear, EOM Intact


HENT: Yes: Atraumatic, Normocephalic


Neck: Yes: Trachea Midline


Respiratory: Yes: CTA Bilaterally


Gastrointestinal: Yes: Normal Bowel Sounds, Soft


Cardiovascular: Yes: Regular Rate and Rhythm


JVD: No


Carotid Bruit: No


PMI: Non-Displaced


Heart Sounds: Yes: S1, S2


Musculoskeletal: Yes: WNL


Extremities: Yes: WNL


Edema: No


Peripheral Pulses WNL: Yes





- Other Data


Labs, Other Data: 


 CBC, BMP





 09/09/19 00:56 





 09/09/19 00:47 





 INR, PTT











INR  1.00  (0.83-1.09)   09/09/19  00:56    








 Troponin, BNP











  09/09/19 09/09/19





  00:47 04:30


 


Troponin I  < 0.02  < 0.02


 


B-Natriuretic Peptide  5.4 








 Troponin, BNP











  09/09/19 09/09/19





  00:47 04:30


 


Troponin I  < 0.02  < 0.02


 


B-Natriuretic Peptide  5.4 














Imaging





- Results


X-ray: Report Reviewed


EKG: Report Reviewed (nsr nssttw changes)





Assessment/Plan


54 year old female with PMHx of HTN, HLD, DM, CAD s/p MI with cath x2 (no 

stenting), GERD, and hypothyroidism arrived to  ED for chest pain x5 days. 

Patient reported her chest pain is left sided, pressure, intermittent, non-

radiating, aggravated by exertion, pain of 7/10. Patient also complain of pain 

neck, dizziness,  mild nausea without vomiting. Patient denies SOB, headache, 

no diarrhea or vomiting. 





Plan:


she is having her stress test today. 


continue outpatient medication. 


further plans after stress test results.

## 2019-09-09 NOTE — PDOC
Attending Attestation





- Resident


Resident Name: Dione Delacruz





- ED Attending Attestation


I have performed the following: I have examined & evaluated the patient, The 

case was reviewed & discussed with the resident, I agree w/resident's findings 

& plan, Exceptions are as noted





- HPI


HPI: 





09/09/19 01:33


55 yo female has left sided chest pain 8/10





- Physicial Exam


PE: 





09/09/19 01:35


wnwd 55 yo female in no acute distress


head ncat


neck no jvd,no bruits


lungs cta b/l


cvs [id number] no rubs,no murmurs


abd nontender


skin warm and dry


 extremities no pitting edema


neuro  axox3,ambulatory





- Medical Decision Making





09/09/19 01:37


pt reports having MI twice in 2007 and 2012 and cardiac catheterization ,she 

did not require stents


pmh  htn,IDDM,hypothyroid





ekg nsr @ 75 bpm


09/09/19 01:45





pt o be admitted to telemetry , r/o MI

## 2019-09-09 NOTE — PDOC
History of Present Illness





- General


Chief Complaint: Chest Pain


Stated Complaint: CHEST PAIN, HEADACHE AND NECK PAIN


Time Seen by Provider: 09/09/19 00:42


History Source: Patient


Exam Limitations: No Limitations





- History of Present Illness


Initial Comments: 


54 year old female with PMH HTN, HLD, DM, CAD s/p MI with cath x2 (no stenting; 

x7 and x12 years ago), obesity, GERD, hypothyroidism presented to ED for chest 

pain x5 days. Pt reported her chest pain is left sided, pressure like, 

intermittent, non-radiating, aggravated by exertion. 





ROS


General: denied fever, chills, generalized weakness.


HEENT: denied sore throat, rhinorrhea, ear pain.


Cardiovascular: admitted to chest pain. denied palpitations, syncope, 

diaphoresis.


Respiratory: denied shortness of breath, cough, sputum production, hemoptysis.


Gastrointestinal: denied abdominal pain, nausea, vomiting, diarrhea, 

constipation, blood in stool.


Genitourinary: denied dysuria, increased urinary frequency, hematuria, urinary 

incontinence, flank pain.


Back: denied back pain.


Musculoskeletal: denied joint pain, muscle pain, joint swelling.


Neurological: denied headache, dizziness, numbness, tingling, weakness. 


Integumentary: denied rash, laceration, abrasion.


Hematologic/Lymphatic: denied bruising or bleeding. 





PE


Constitutional: Well-nourished, Well-developed, appearing stated age.


HEENT: head is normocephalic, atraumatic. EOMI. PERRLA. no posterior pharyngeal 

erythema.no tonsillar swelling or exudates bilaterally. uvula midline. no 

peritonsillar swelling, tenderness or abscess. no jaw tenderness or 

misalignment. 


Neck: supple. Full ROM.


Cardiovascular: regular heart rhythm. no murmurs. no pericardial friction rub. 


Chest: no tenderness to anterior chest wall. 


Respiratory: clear to auscultation bilaterally. no crackles, rhonchi or 

wheezing. no stridor.


Gastrointestinal: soft, nontender. normal bowel sounds. no rebound, guarding, 

masses. 


Extremities: peripheral pulses intact. 1+ pitting edema to bilateral LE. 


Neurological: CN 2-12 grossly intact. moves all four extremities. 


Psych: awake, alert, oriented x3. follows commands. answers questions 

appropriately. 








Past History





- Past Medical History


Allergies/Adverse Reactions: 


 Allergies











Allergy/AdvReac Type Severity Reaction Status Date / Time


 


codeine phosphate Allergy Intermediate Rash Verified 09/09/19 00:37





[From Tylenol-Codeine #3]     











Home Medications: 


Ambulatory Orders





Amlodipine Besylate [Norvasc -] 10 mg PO DAILY 12/30/14 


Fluticasone Propionate [Flovent Diskus] 50 mcg IH BID PRN 01/16/15 


Insulin Aspart [Novolog] 15 unit SQ TID 04/11/17 


Levothyroxine [Synthroid -] 125 mcg PO DAILY 04/11/17 


Aspirin [Ecotrin] 81 mg PO DAILY 03/29/18 


Atorvastatin Ca [Lipitor] 10 mg PO HS 03/29/18 


Cholecalciferol (Vitamin D3) [Vitamin D3] 5,000 unit PO DAILY 03/29/18 


Ibuprofen 600 mg PO ASDIR PRN 03/29/18 


Insulin Detemir [Levemir Flextouch] 45 unit SQ BID 03/29/18 


Losartan Potassium 100 mg PO DAILY 03/29/18 


oxyCODONE HCL [Roxicodone -] 5 mg PO Q4H PRN #20 tablet MDD 6 04/02/18 


Cyclobenzaprine HCl [Flexeril -] 10 mg PO BID #10 tablet 10/28/18 


Azithromycin 250 mg PO DAILY #6 tablet 02/05/19 


Ibuprofen 600 mg PO QID PRN #20 tablet 03/27/19 








Anemia: No


Asthma: No


Cancer: No


Cardiac Disorders: Yes ("mild heart attack"2011-cardiac cath X2 NO FINDING,NO 

STENTS)


CVA: No


COPD: No


CHF: No


Dementia: No


Diabetes: Yes (IDDM X15 YEARS)


GI Disorders: No


 Disorders: No


HTN: Yes


Hypercholesterolemia: Yes


Liver Disease: No


Seizures: No


Thyroid Disease: Yes





- Surgical History


Abdominal Surgery: Yes ("TUMMY TUCK" 2010)


Appendectomy: No


Cardiac Surgery: No


Cholecystectomy: No


Lung Surgery: No


Neurologic Surgery: No


Orthopedic Surgery: Yes (RIGHT KNEE SX X3, scope, yang-knee)





- Suicide/Smoking/Psychosocial Hx


Smoking Status: No


Smoking History: Never smoked


Have you smoked in the past 12 months: No


Number of Cigarettes Smoked Daily: 0


Hx Alcohol Use: No


Drug/Substance Use Hx: No


Substance Use Type: None


Hx Substance Use Treatment: No





*Physical Exam





- Vital Signs


 Last Vital Signs











Temp Pulse Resp BP Pulse Ox


 


 98.0 F   79   18   136/72   100 


 


 09/09/19 00:37  09/09/19 00:37  09/09/19 00:37  09/09/19 00:37  09/09/19 00:37














ED Treatment Course





- LABORATORY


CBC & Chemistry Diagram: 


 09/09/19 00:56





 09/09/19 00:47





Medical Decision Making





- Medical Decision Making


54 year old female with above PMH presented to ED for chest pain x5 days, 

worsening in severity (6/10 to 8/10) today. 





 Initial Vital Signs











Temp Pulse Resp BP Pulse Ox


 


 98.0 F   79   18   136/72   100 


 


 09/09/19 00:37  09/09/19 00:37  09/09/19 00:37  09/09/19 00:37  09/09/19 00:37








Afebrile. 


No tachycardia. 


No tachypnea. 


Mild hypertension. 


No hypoxia on room air. 





Labs ordered: CBC, CMP, troponin, BNP, coags, TSH


Imaging ordered: CXR


Medications ordered:  mg PO CHEW once, Pepcid IV once, tylenol 975 mg PO 

once





EKG performed at 0026: rate 75, regular rhythm, normal axis, normal intervals, 

, no acute ST changes. 





CXR my view: sharp costophrenic angles. borderline cardiomegaly. no infiltrate. 

no large pneumothorax, no fluid overload. 


-Pending official report. 





 CBC











WBC  6.0 K/mm3 (4.0-10.0)   09/09/19  00:56    


 


RBC  4.28 M/mm3 (3.60-5.2)   09/09/19  00:56    


 


Hgb  13.5 GM/dL (10.7-15.3)   09/09/19  00:56    


 


Hct  39.5 % (32.4-45.2)   09/09/19  00:56    


 


MCV  92.1 fl (80-96)   09/09/19  00:56    


 


MCH  31.4 pg (25.7-33.7)   09/09/19  00:56    


 


MCHC  34.1 g/dl (32.0-36.0)   09/09/19  00:56    


 


RDW  14.2 % (11.6-15.6)   09/09/19  00:56    


 


Plt Count  191 K/MM3 (134-434)   09/09/19  00:56    


 


MPV  11.0 fl (7.5-11.1)   09/09/19  00:56    


 


Absolute Neuts (auto)  3.2 K/mm3 (1.5-8.0)   09/09/19  00:56    


 


Neutrophils %  52.6 % (42.8-82.8)   09/09/19  00:56    


 


Lymphocytes %  36.9 % (8-40)   09/09/19  00:56    


 


Monocytes %  7.7 % (3.8-10.2)   09/09/19  00:56    


 


Eosinophils %  1.7 % (0-4.5)   09/09/19  00:56    


 


Basophils %  1.1 % (0-2.0)   09/09/19  00:56    


 


Nucleated RBC %  0 % (0-0)   09/09/19  00:56    








No leukocytosis. 


No anemia. 





 CMP











Sodium  149 mmol/L (136-145)  H  09/09/19  00:47    


 


Potassium  4.1 mmol/L (3.5-5.1)   09/09/19  00:47    


 


Chloride  111 mmol/L ()  H  09/09/19  00:47    


 


Carbon Dioxide  28 mmol/L (21-32)   09/09/19  00:47    


 


Anion Gap  10 MMOL/L (8-16)   09/09/19  00:47    


 


BUN  16.8 mg/dL (7-18)   09/09/19  00:47    


 


Creatinine  0.5 mg/dL (0.55-1.3)  L  09/09/19  00:47    


 


Est GFR (CKD-EPI)AfAm  126.28   09/09/19  00:47    


 


Est GFR (CKD-EPI)NonAf  108.96   09/09/19  00:47    


 


Random Glucose  92 mg/dL ()   09/09/19  00:47    


 


Calcium  9.3 mg/dL (8.5-10.1)   09/09/19  00:47    


 


Total Bilirubin  0.7 mg/dL (0.2-1)   09/09/19  00:47    


 


AST  38 U/L (15-37)  H  09/09/19  00:47    


 


ALT  60 U/L (13-61)   09/09/19  00:47    


 


Alkaline Phosphatase  129 U/L ()  H  09/09/19  00:47    


 


Creatine Kinase  210 U/L ()  H  09/09/19  00:47    


 


Creatine Kinase Index  0.5 % (0.0-5.0)   09/09/19  00:47    


 


CK-MB (CK-2)  1.2 ng/mL (0.5-3.6)   09/09/19  00:47    


 


Troponin I  < 0.02 ng/ml (0.00-0.05)   09/09/19  00:47    


 


B-Natriuretic Peptide  5.4 pg/ml (5-125)   09/09/19  00:47    


 


Total Protein  7.8 g/dl (6.4-8.2)   09/09/19  00:47    


 


Albumin  4.0 g/dl (3.4-5.0)   09/09/19  00:47    


 


Lipase  136 U/L ()   09/09/19  00:47    


 


TSH  3.99 uIU/ml (0.358-3.74)  H  09/09/19  00:47    








Mild hypernatremia


-Normal saline 1L ordered


Troponin undetectable


-Will repeat


BNP not elevated





*DC/Admit/Observation/Transfer


Diagnosis at time of Disposition: 


 Chest pain








- Discharge Dispostion


Condition at time of disposition: Stable


Decision to Admit order: Yes





- Referrals


Referrals: 


Sanchez Villegas MD, MD [Primary Care Provider] - 





- Patient Instructions





- Post Discharge Activity

## 2019-09-10 VITALS — SYSTOLIC BLOOD PRESSURE: 123 MMHG | DIASTOLIC BLOOD PRESSURE: 85 MMHG | TEMPERATURE: 98.1 F | HEART RATE: 81 BPM

## 2019-09-10 LAB
ANION GAP SERPL CALC-SCNC: 8 MMOL/L (ref 8–16)
BUN SERPL-MCNC: 12.1 MG/DL (ref 7–18)
CALCIUM SERPL-MCNC: 9 MG/DL (ref 8.5–10.1)
CHLORIDE SERPL-SCNC: 105 MMOL/L (ref 98–107)
CHOLEST SERPL-MCNC: 156 MG/DL (ref 50–200)
CO2 SERPL-SCNC: 29 MMOL/L (ref 21–32)
CREAT SERPL-MCNC: 0.5 MG/DL (ref 0.55–1.3)
GLUCOSE SERPL-MCNC: 129 MG/DL (ref 74–106)
HDLC SERPL-MCNC: 37 MG/DL (ref 40–60)
POTASSIUM SERPLBLD-SCNC: 3.4 MMOL/L (ref 3.5–5.1)
SODIUM SERPL-SCNC: 141 MMOL/L (ref 136–145)
TRIGL SERPL-MCNC: 206 MG/DL (ref 0–150)

## 2019-09-10 RX ADMIN — LOSARTAN POTASSIUM SCH MG: 50 TABLET, FILM COATED ORAL at 10:54

## 2019-09-10 RX ADMIN — INSULIN ASPART SCH UNIT: 100 INJECTION, SOLUTION INTRAVENOUS; SUBCUTANEOUS at 12:08

## 2019-09-10 RX ADMIN — INSULIN ASPART SCH: 100 INJECTION, SOLUTION INTRAVENOUS; SUBCUTANEOUS at 05:59

## 2019-09-10 RX ADMIN — AMLODIPINE BESYLATE SCH MG: 10 TABLET ORAL at 10:55

## 2019-09-10 RX ADMIN — HEPARIN SODIUM SCH UNIT: 5000 INJECTION, SOLUTION INTRAVENOUS; SUBCUTANEOUS at 11:55

## 2019-09-10 RX ADMIN — INSULIN DETEMIR SCH UNIT: 100 INJECTION, SOLUTION SUBCUTANEOUS at 07:53

## 2019-09-10 NOTE — DS
Physical Examination


Vital Signs: 


 Vital Signs











Temperature  98 F   09/10/19 06:00


 


Pulse Rate  72   09/10/19 06:00


 


Respiratory Rate  20   09/10/19 06:00


 


Blood Pressure  132/66   09/10/19 06:00


 


O2 Sat by Pulse Oximetry (%)  95   09/10/19 05:00











Cardiovascular: Yes: Regular Rate and Rhythm


Respiratory: Yes: Regular, CTA Bilaterally


Gastrointestinal: Yes: Normal Bowel Sounds, Soft


Labs: 


 CBC, BMP





 09/09/19 00:56 





 09/10/19 06:35 











Discharge Summary


Reason For Visit: CHEST PAIN


Current Active Problems





CAD (coronary artery disease) (Acute)


Chest pain (Acute)


Chest pain, rule out acute myocardial infarction (Acute)


Diabetes (Acute)


HLD (hyperlipidemia) (Acute)


HTN (hypertension) (Acute)


Hypothyroidism (Acute)








Hospital Course: 





54 year old female with PMHx of HTN, HLD, DM, CAD s/p MI with cath x2 (no 

stenting), GERD, and hypothyroidism arrived to  ED for chest pain x5 days. 

Patient reported her chest pain is left sided, pressure, intermittent, non-

radiating, aggravated by exertion, pain of 7/10





- Problems


(1) CAD (coronary artery disease)


Assessment/Plan: 


stress test negative


cp maybe small vessel disease will add ranexa


cardiology eval noted--will await for follow up--possible dc vs cath--last cath 

7 years ago


CE 2 sets negative


Code(s): I25.10 - ATHSCL HEART DISEASE OF NATIVE CORONARY ARTERY W/O ANG PCTRS 

  





(2) Diabetes


Assessment/Plan: 


insulin


bgm


hgba1c


endocrine


Code(s): E11.9 - TYPE 2 DIABETES MELLITUS WITHOUT COMPLICATIONS   


Qualifiers: 


   Diabetes mellitus type: type 2 





(3) HLD (hyperlipidemia)


Assessment/Plan: 


lipid panel


 LDL < 70


Code(s): E78.5 - HYPERLIPIDEMIA, UNSPECIFIED   





(4) HTN (hypertension)


Assessment/Plan: 


norvasc and cozaar


Code(s): I10 - ESSENTIAL (PRIMARY) HYPERTENSION   





(5) Hypothyroidism


Assessment/Plan: 


tsh noted dose increased


Code(s): E03.9 - HYPOTHYROIDISM, UNSPECIFIED   





Condition: Stable





- Instructions


Disposition: HOME





- Home Medications


Comprehensive Discharge Medication List: 


Ambulatory Orders





Amlodipine Besylate [Norvasc -] 10 mg PO DAILY 12/30/14 


Fluticasone Propionate [Flovent Diskus] 50 mcg IH BID PRN 01/16/15 


Levothyroxine [Synthroid -] 125 mcg PO DAILY 04/11/17 


Aspirin [Ecotrin] 81 mg PO DAILY 03/29/18 


Atorvastatin Ca [Lipitor] 10 mg PO HS 03/29/18 


Cholecalciferol (Vitamin D3) [Vitamin D3] 5,000 unit PO DAILY 03/29/18 


Losartan Potassium 100 mg PO DAILY 03/29/18 


Insulin Glargine,Hum.rec.anlog [Lantus] 45 unit SQ BID 09/09/19 


Ranolazine [Ranexa -] 500 mg PO BID #60 tab 09/10/19

## 2019-10-12 ENCOUNTER — HOSPITAL ENCOUNTER (OUTPATIENT)
Dept: HOSPITAL 74 - JER | Age: 54
Setting detail: OBSERVATION
LOS: 1 days | Discharge: HOME | End: 2019-10-13
Attending: FAMILY MEDICINE | Admitting: FAMILY MEDICINE
Payer: COMMERCIAL

## 2019-10-12 VITALS — BODY MASS INDEX: 29.8 KG/M2

## 2019-10-12 DIAGNOSIS — I25.2: ICD-10-CM

## 2019-10-12 DIAGNOSIS — I25.10: ICD-10-CM

## 2019-10-12 DIAGNOSIS — R11.2: ICD-10-CM

## 2019-10-12 DIAGNOSIS — E78.5: ICD-10-CM

## 2019-10-12 DIAGNOSIS — Z79.4: ICD-10-CM

## 2019-10-12 DIAGNOSIS — Z98.61: ICD-10-CM

## 2019-10-12 DIAGNOSIS — E03.9: ICD-10-CM

## 2019-10-12 DIAGNOSIS — R19.7: ICD-10-CM

## 2019-10-12 DIAGNOSIS — E66.9: ICD-10-CM

## 2019-10-12 DIAGNOSIS — R07.89: Primary | ICD-10-CM

## 2019-10-12 DIAGNOSIS — E87.6: ICD-10-CM

## 2019-10-12 DIAGNOSIS — Z79.82: ICD-10-CM

## 2019-10-12 DIAGNOSIS — E11.9: ICD-10-CM

## 2019-10-12 DIAGNOSIS — Z88.5: ICD-10-CM

## 2019-10-12 DIAGNOSIS — K21.9: ICD-10-CM

## 2019-10-12 LAB
ALBUMIN SERPL-MCNC: 4.8 G/DL (ref 3.4–5)
ALP SERPL-CCNC: 142 U/L (ref 45–117)
ALT SERPL-CCNC: 58 U/L (ref 13–61)
ANION GAP SERPL CALC-SCNC: 9 MMOL/L (ref 8–16)
AST SERPL-CCNC: 23 U/L (ref 15–37)
BASOPHILS # BLD: 0.7 % (ref 0–2)
BILIRUB SERPL-MCNC: 1.1 MG/DL (ref 0.2–1)
BNP SERPL-MCNC: 11.6 PG/ML (ref 5–125)
BUN SERPL-MCNC: 14.6 MG/DL (ref 7–18)
CALCIUM SERPL-MCNC: 10.4 MG/DL (ref 8.5–10.1)
CHLORIDE SERPL-SCNC: 105 MMOL/L (ref 98–107)
CO2 SERPL-SCNC: 26 MMOL/L (ref 21–32)
CREAT SERPL-MCNC: 1.1 MG/DL (ref 0.55–1.3)
DEPRECATED RDW RBC AUTO: 14.1 % (ref 11.6–15.6)
EOSINOPHIL # BLD: 0.3 % (ref 0–4.5)
GLUCOSE SERPL-MCNC: 105 MG/DL (ref 74–106)
HCT VFR BLD CALC: 44.7 % (ref 32.4–45.2)
HGB BLD-MCNC: 15 GM/DL (ref 10.7–15.3)
INR BLD: 1.04 (ref 0.83–1.09)
LYMPHOCYTES # BLD: 12.2 % (ref 8–40)
MAGNESIUM SERPL-MCNC: 2.2 MG/DL (ref 1.8–2.4)
MCH RBC QN AUTO: 30.8 PG (ref 25.7–33.7)
MCHC RBC AUTO-ENTMCNC: 33.6 G/DL (ref 32–36)
MCV RBC: 91.5 FL (ref 80–96)
MONOCYTES # BLD AUTO: 7.9 % (ref 3.8–10.2)
NEUTROPHILS # BLD: 78.9 % (ref 42.8–82.8)
PLATELET # BLD AUTO: 231 K/MM3 (ref 134–434)
PMV BLD: 11.5 FL (ref 7.5–11.1)
POTASSIUM SERPLBLD-SCNC: 3.3 MMOL/L (ref 3.5–5.1)
PROT SERPL-MCNC: 9.1 G/DL (ref 6.4–8.2)
PT PNL PPP: 12.3 SEC (ref 9.7–13)
RBC # BLD AUTO: 4.89 M/MM3 (ref 3.6–5.2)
SODIUM SERPL-SCNC: 140 MMOL/L (ref 136–145)
WBC # BLD AUTO: 10.2 K/MM3 (ref 4–10)

## 2019-10-12 PROCEDURE — 3E033GC INTRODUCTION OF OTHER THERAPEUTIC SUBSTANCE INTO PERIPHERAL VEIN, PERCUTANEOUS APPROACH: ICD-10-PCS | Performed by: FAMILY MEDICINE

## 2019-10-12 PROCEDURE — G0378 HOSPITAL OBSERVATION PER HR: HCPCS

## 2019-10-12 NOTE — PDOC
History of Present Illness





- General


Chief Complaint: Vomiting/Diarrhea


Stated Complaint: VOMITING/DIARRHEA


Time Seen by Provider: 10/12/19 21:13


History Source: Patient


Exam Limitations: No Limitations





- History of Present Illness


Initial Comments: 





10/12/19 21:42





53 yo female pmh of HTN, HLD, IDDM, CAD s/p MI with 2 caths (no stent, most 

recent 7 years ago) obesity, GERD, hypothyroidism presents to the ED with with 

1 day of NB/NB vomiting, diarrhea, dizziness, intermittent exertional CP and 

epigastric pain. Pt states the symptoms began suddenly and have persisted all 

day. Pt admits to having BG in the 200s yesterday (average is around 120) and 

today the monitor read error. Pt admits to intermittent CP relieved today by 

162 ASA and rest, made worse on exertion. Last Nuc stress 09/09/2019 shows 

normal stress test EF 63%. Denies recent illness, recent travel, sick contacts, 

calf tenderness, F/C, back pain, leg swelling 














Past History





- Past Medical History


Allergies/Adverse Reactions: 


 Allergies











Allergy/AdvReac Type Severity Reaction Status Date / Time


 


codeine phosphate Allergy Intermediate Rash Verified 10/12/19 20:44





[From Tylenol-Codeine #3]     











Home Medications: 


Ambulatory Orders





Amlodipine Besylate [Norvasc -] 10 mg PO DAILY 12/30/14 


Fluticasone Propionate [Flovent Diskus] 50 mcg IH BID PRN 01/16/15 


Levothyroxine [Synthroid -] 125 mcg PO DAILY 04/11/17 


Aspirin [Ecotrin] 81 mg PO DAILY 03/29/18 


Atorvastatin Ca [Lipitor] 10 mg PO HS 03/29/18 


Cholecalciferol (Vitamin D3) [Vitamin D3] 5,000 unit PO DAILY 03/29/18 


Losartan Potassium 100 mg PO DAILY 03/29/18 


Insulin Glargine,Hum.rec.anlog [Lantus] 45 unit SQ BID 09/09/19 


Ranolazine [Ranexa -] 500 mg PO BID #60 tab 09/10/19 








Anemia: No


Asthma: No


Cancer: No


Cardiac Disorders: Yes ("mild heart attack"2011-cardiac cath X2 NO FINDING,NO 

STENTS)


CVA: No


COPD: No


CHF: No


Dementia: No


Diabetes: Yes (IDDM X15 YEARS)


GI Disorders: No


 Disorders: No


HTN: Yes


Hypercholesterolemia: Yes


Liver Disease: No


Seizures: No


Thyroid Disease: Yes





- Surgical History


Abdominal Surgery: Yes ("TUMMY TUCK" 2010)


Appendectomy: No


Cardiac Surgery: No


Cholecystectomy: No


Lung Surgery: No


Neurologic Surgery: No


Orthopedic Surgery: Yes (RIGHT KNEE SX X3, scope, yang-knee)





- Psycho Social/Smoking Cessation Hx


Smoking Status: No


Smoking History: Never smoked


Have you smoked in the past 12 months: No


Number of Cigarettes Smoked Daily: 0


Information on smoking cessation initiated: No


Hx Alcohol Use: No


Drug/Substance Use Hx: No


Substance Use Type: None


Hx Substance Use Treatment: No





**Review of Systems





- Review of Systems


Constitutional: No: Chills, Fever


HEENTM: No: Blurred Vision


Respiratory: No: Shortness of Breath


Cardiac (ROS): Yes: Chest Pain.  No: Edema


ABD/GI: Yes: Diarrhea, Nausea, Vomiting.  No: Constipated


: No: Burning, Dysuria, Flank Pain, Hematuria


Integumentary: No: Change in Color


Neurological: Yes: Dizziness.  No: Headache, Numbness, Paresthesia, Unsteady 

Gait





*Physical Exam





- Vital Signs


 Last Vital Signs











Temp Pulse Resp BP Pulse Ox


 


 98.6 F   99 H  18   110/62   100 


 


 10/12/19 20:41  10/12/19 20:41  10/12/19 20:41  10/12/19 20:41  10/12/19 20:41














- Physical Exam


General Appearance: Yes: Nourished, Appropriately Dressed.  No: Apparent 

Distress


HEENT: positive: EOMI


Neck: positive: Supple.  negative: Rigid, Carotid bruit


Respiratory/Chest: positive: Lungs Clear, Normal Breath Sounds.  negative: 

Respiratory Distress, Accessory Muscle Use, Crackles, Rales, Rhonchi, Stridor, 

Wheezing


Cardiovascular: positive: Regular Rhythm, Regular Rate, S1, S2.  negative: Edema

, JVD, Murmur


Vascular Pulses: Dorsalis-Pedis (R): 4+, Doralis-Pedis (L): 4+


Gastrointestinal/Abdominal: positive: Flat, Soft.  negative: Protuberent, 

Distended, Guarding, Rebound, Tenderness


Musculoskeletal: negative: CVA Tenderness


Extremity: positive: Normal Inspection


Integumentary: positive: Normal Color, Dry, Warm


Neurologic: positive: Fully Oriented, Alert, Normal Mood/Affect





ED Treatment Course





- LABORATORY


CBC & Chemistry Diagram: 


 10/12/19 21:31





 10/12/19 21:31





- ADDITIONAL ORDERS


Additional order review: 


 Laboratory  Results











  10/12/19





  21:39


 


POC Glucometer  94








 











  10/12/19





  21:39


 


POC Glucometer  94














- RADIOLOGY


Radiology Studies Ordered: 














 Category Date Time Status


 


 CHEST PA & LAT [RAD] Stat Radiology  10/12/19 21:17 Ordered














Medical Decision Making





- Medical Decision Making





10/12/19 22:21


53 yo female pmh of HTN, HLD, IDDM, CAD s/p MI with 2 caths (no stent, most 

recent 7 years ago) obesity, GERD, hypothyroidism presents to the ED with with 

1 day of NB/NB vomiting, diarrhea, dizziness, intermittent exertional CP and 

epigastric pain. Pt states the symptoms began suddenly and have persisted all 

day. Pt admits to having BG in the 200s yesterday (average is around 120) and 

today the monitor read error. Pt admits to intermittent CP relieved today by 

162 ASA and rest, made worse on exertion. Last Nuc stress 09/09/2019 shows 

normal stress test EF 63%. Denies recent illness, recent travel, sick contacts, 

calf tenderness, F/C, back pain, leg swelling 





vitals WNL





DDX INLT: DKA, ACS, viral gastroeneritis





labs including anion gap, trops neg. BS WNL





EKG NSR, no ischemic changes 





CXR no acute pathology





Pt given 4 mg IV zofran with improvement in nausea 





Pt will require admission for atypical CP 














Discharge





- Discharge Information


Problems reviewed: Yes


Clinical Impression/Diagnosis: 


 Atypical chest pain





Condition: Stable





- Admission


Yes





- Follow up/Referral





- Patient Discharge Instructions





- Post Discharge Activity

## 2019-10-13 VITALS — HEART RATE: 82 BPM | TEMPERATURE: 98.3 F | DIASTOLIC BLOOD PRESSURE: 75 MMHG | SYSTOLIC BLOOD PRESSURE: 130 MMHG

## 2019-10-13 LAB
ANION GAP SERPL CALC-SCNC: 9 MMOL/L (ref 8–16)
BUN SERPL-MCNC: 17.7 MG/DL (ref 7–18)
CALCIUM SERPL-MCNC: 9.7 MG/DL (ref 8.5–10.1)
CHLORIDE SERPL-SCNC: 107 MMOL/L (ref 98–107)
CO2 SERPL-SCNC: 26 MMOL/L (ref 21–32)
CREAT SERPL-MCNC: 0.7 MG/DL (ref 0.55–1.3)
DEPRECATED RDW RBC AUTO: 14.1 % (ref 11.6–15.6)
GLUCOSE SERPL-MCNC: 115 MG/DL (ref 74–106)
HCT VFR BLD CALC: 39.9 % (ref 32.4–45.2)
HGB BLD-MCNC: 13.6 GM/DL (ref 10.7–15.3)
MCH RBC QN AUTO: 31.3 PG (ref 25.7–33.7)
MCHC RBC AUTO-ENTMCNC: 34.2 G/DL (ref 32–36)
MCV RBC: 91.6 FL (ref 80–96)
PLATELET # BLD AUTO: 186 K/MM3 (ref 134–434)
PMV BLD: 11 FL (ref 7.5–11.1)
POTASSIUM SERPLBLD-SCNC: 3.6 MMOL/L (ref 3.5–5.1)
RBC # BLD AUTO: 4.36 M/MM3 (ref 3.6–5.2)
SODIUM SERPL-SCNC: 142 MMOL/L (ref 136–145)
WBC # BLD AUTO: 7.2 K/MM3 (ref 4–10)

## 2019-10-13 NOTE — HP
Admitting History and Physical





- Primary Care Physician


PCP: Dr. Ortiz 





- Admission


Chief Complaint: Voimting/ Diarrhea


History of Present Illness: 





54 year old female with PMH of HTN, HLD, DM, CAD s/p MI (with 2 caths, no stent

) GERD, hypothyroidism arrived to ED for one day of vomiting, diarrhea, 

dizziness, intermittent chest pain with exertion, and epigastric pain. Symptoms 

began suddenly and persisted all day. Denies SOB, dizziness, headache, leg 

swelling. 


History Source: Patient


Limitations to Obtaining History: No Limitations





- Past Medical History


Cardiovascular: Yes: CAD (s/p 2 cath), HTN, Hyperlipdemia, MI


Gastrointestinal: Yes: GERD


...LMP: 02/18/18


Endocrine: Yes: Diabetes Mellitus, Hypothyroidism





- Past Surgical History


Past Surgical History: Yes: Joint Replacement


Additional Past Surgical History: 





[place] in 2010, Right knee x3 





- Smoking History


Smoking history: Never smoked


Have you smoked in the past 12 months: No


Aproximately how many cigarettes per day: 0





- Alcohol/Substance Use


Hx Alcohol Use: No


History of Substance Use: reports: None





- Social History


Usual Living Arrangement: Yes: With Significant Other


ADL: Independent


History of Recent Travel: No





Home Medications





- Allergies


Allergies/Adverse Reactions: 


 Allergies











Allergy/AdvReac Type Severity Reaction Status Date / Time


 


codeine phosphate Allergy Intermediate Rash Verified 10/12/19 20:44





[From Tylenol-Codeine #3]     














- Home Medications


Home Medications: 


Ambulatory Orders





Amlodipine Besylate [Norvasc -] 10 mg PO DAILY 12/30/14 


Fluticasone Propionate [Flovent Diskus] 50 mcg IH BID PRN 01/16/15 


Levothyroxine [Synthroid -] 125 mcg PO DAILY 04/11/17 


Aspirin [Ecotrin] 81 mg PO DAILY 03/29/18 


Atorvastatin Ca [Lipitor] 10 mg PO HS 03/29/18 


Cholecalciferol (Vitamin D3) [Vitamin D3] 5,000 unit PO DAILY 03/29/18 


Losartan Potassium 100 mg PO DAILY 03/29/18 


Insulin Glargine,Hum.rec.anlog [Lantus] 45 unit SQ BID 09/09/19 


Ranolazine [Ranexa -] 500 mg PO BID #60 tab 09/10/19 


Insulin (Novolog) [Novolog] 15 units SQ TID 10/13/19 











Family Medical History


Family History: Denies





Review of Systems





- Review of Systems


Constitutional: reports: Malaise, Weakness


Eyes: reports: No Symptoms


HENT: reports: No Symptoms


Neck: reports: No Symptoms


Cardiovascular: reports: Chest Pain


Respiratory: reports: No Symptoms


Gastrointestinal: reports: Abdominal Pain, Nausea, Vomiting


Genitourinary: reports: No Symptoms


Musculoskeletal: reports: No Symptoms


Integumentary: reports: No Symptoms


Neurological: reports: Dizziness


Endocrine: reports: No Symptoms


Hematology/Lymphatic: reports: No Symptoms


Psychiatric: reports: No Symptoms





Physical Examination


Vital Signs: 


 Vital Signs











Temperature  98.6 F   10/12/19 20:41


 


Pulse Rate  99 H  10/12/19 20:41


 


Respiratory Rate  18   10/12/19 20:41


 


Blood Pressure  110/62   10/12/19 20:41


 


O2 Sat by Pulse Oximetry (%)  100   10/12/19 20:41











Constitutional: Yes: No Distress


Eyes: Yes: Conjunctiva Clear, EOM Intact


HENT: Yes: Atraumatic, Normocephalic


Neck: Yes: Supple, Trachea Midline


Cardiovascular: Yes: Regular Rate and Rhythm


Respiratory: Yes: Regular, CTA Bilaterally


Gastrointestinal: Yes: Normal Bowel Sounds, Soft


Musculoskeletal: Yes: WNL


Extremities: Yes: WNL


Edema: No


Peripheral Pulses WNL: Yes


Integumentary: Yes: WNL


Neurological: Yes: WNL


Labs: 


 CBC, BMP





 10/12/19 21:31 





 10/12/19 21:31 











Imaging





- Results


Chest X-ray: Report Reviewed ( CXR no acute pathology)


EKG: Report Reviewed (EKG: NSR, no ischemic changes; trops neg.)





Problem List





- Problems


(1) Atypical chest pain


Code(s): R07.89 - OTHER CHEST PAIN   





(2) Nausea & vomiting


Code(s): R11.2 - NAUSEA WITH VOMITING, UNSPECIFIED   





(3) CAD (coronary artery disease)


Code(s): I25.10 - ATHSCL HEART DISEASE OF NATIVE CORONARY ARTERY W/O ANG PCTRS 

  





(4) Diabetes


Code(s): E11.9 - TYPE 2 DIABETES MELLITUS WITHOUT COMPLICATIONS   


Qualifiers: 


   Diabetes mellitus type: type 2 





(5) HLD (hyperlipidemia)


Code(s): E78.5 - HYPERLIPIDEMIA, UNSPECIFIED   





(6) HTN (hypertension)


Code(s): I10 - ESSENTIAL (PRIMARY) HYPERTENSION   





(7) Hypothyroidism


Code(s): E03.9 - HYPOTHYROIDISM, UNSPECIFIED   





(8) Hypokalemia


Code(s): E87.6 - HYPOKALEMIA   





Assessment/Plan





54 year old female with PMH of HTN, HLD, DM, CAD s/p MI (with 2 caths, no stent

) GERD, hypothyroidism arrived to ED for one day of vomiting, diarrhea, 

dizziness, intermittent chest pain with exertion, and epigastric pain. Symptoms 

began suddenly and persisted all day.





# Atypical chest pain r/o ACS 


# Nausea/ Vomiting 


- 4 mg IV zofran with improvement in nausea 


- EKG NSR, no ischemic changes 


- trops negative 


- CXR no acute pathology


- cardiology follow up


- zofran q 6 PRN 


- Tylenol q 6 hours pRN 





#Hypokalemia 


- KCL given , repeat BMP 





# HTN/HLD


#CAD


- Continue with ASA 81 mg daily 


-Amlodipine Besylate 10 mg PO DAILY 


-Atorvastatin Ca 10 mg PO HS  


-Losartan Potassium 100 mg PO DAILY 


-Ranolazine 500 mg PO BID 








# Hypothyroidism 


- Levothyroxine [Synthroid -] 125 mcg PO DAILY 04/11/17 





#DM


- monitor FSBS, coverage with sliding scale BID AC 


- Continue with Lantus 45 unit SQ at night  











Visit type





- Emergency Visit


Emergency Visit: Yes


ED Registration Date: 10/12/19


Care time: The patient presented to the Emergency Department on the above date 

and was hospitalized for further evaluation of their emergent condition.





- New Patient


This patient is new to me today: Yes


Date on this admission: 10/13/19





- Critical Care


Critical Care patient: No

## 2019-10-13 NOTE — EKG
Test Reason : 

Blood Pressure : ***/*** mmHG

Vent. Rate : 083 BPM     Atrial Rate : 083 BPM

   P-R Int : 170 ms          QRS Dur : 096 ms

    QT Int : 402 ms       P-R-T Axes : 052 -01 067 degrees

   QTc Int : 472 ms

 

NORMAL SINUS RHYTHM

NONSPECIFIC T WAVE ABNORMALITY

PROLONGED QT

ABNORMAL ECG

WHEN COMPARED WITH ECG OF 09-SEP-2019 04:33,

NO SIGNIFICANT CHANGE WAS FOUND

Confirmed by Vee Silva ([id number]) on 10/13/2019 8:23:59 AM

 

Referred By:             Confirmed By:Vee Silva

## 2019-10-13 NOTE — PN
Progress Note, Physician


Chief Complaint: 





NOTES REVIEWED


NO FEVERS


REPORTS DIARRHEA X 1 DAY NON-BLOODY


CHEST PAIN STERNAL NON-RADIATING NOW RESOLVED





- Current Medication List


Current Medications: 


Active Medications





Acetaminophen (Tylenol -)  500 mg PO Q6H PRN


   PRN Reason: PAIN LEVEL 1-5


Amlodipine Besylate (Norvasc -)  10 mg PO DAILY [facility]


Aspirin (Ecotrin -)  81 mg PO DAILY [facility]


Atorvastatin Calcium (Lipitor -)  10 mg PO HS [facility]


Insulin Aspart (Novolog Vial)  1 units SQ TID [facility]; Protocol


   Last Admin: 10/13/19 07:41 Dose:  Not Given


Insulin Detemir (Levemir Vial)  45 units SQ HS [facility]


Levothyroxine Sodium (Synthroid -)  125 mcg PO DAILY@0700 [facility]


Losartan Potassium (Cozaar -)  100 mg PO DAILY [facility]


Ondansetron HCl (Zofran Injection)  4 mg IVPUSH Q6H PRN


   PRN Reason: NAUSEA


Ranolazine (Ranexa -)  500 mg PO BID [facility]











- Objective


Vital Signs: 


 Vital Signs











Temperature  98.5 F   10/13/19 06:58


 


Pulse Rate  72   10/13/19 06:58


 


Respiratory Rate  17   10/13/19 06:58


 


Blood Pressure  141/72   10/13/19 06:58


 


O2 Sat by Pulse Oximetry (%)  97   10/13/19 06:58











Constitutional: Yes: Mild Distress


Cardiovascular: Yes: Regular Rate and Rhythm


Respiratory: Yes: WNL


Gastrointestinal: Yes: Soft, Tenderness


Genitourinary: Yes: WNL


Musculoskeletal: Yes: WNL


Extremities: Yes: WNL


Edema: No


Peripheral Pulses WNL: Yes


Integumentary: Yes: WNL


Wound/Incision: Yes: Clean/Dry


Neurological: Yes: WNL


...Motor Strength: WNL


Psychiatric: Yes: WNL


Labs: 


 CBC, BMP





 10/13/19 05:15 





 10/13/19 05:15 





 INR, PTT











INR  1.04  (0.83-1.09)   10/12/19  21:31    














Problem List





- Problems


(1) Diarrhea


Code(s): R19.7 - DIARRHEA, UNSPECIFIED   





(2) Atypical chest pain


Code(s): R07.89 - OTHER CHEST PAIN   





(3) Hypokalemia


Code(s): E87.6 - HYPOKALEMIA   





(4) Nausea & vomiting


Code(s): R11.2 - NAUSEA WITH VOMITING, UNSPECIFIED   





(5) CAD (coronary artery disease)


Code(s): I25.10 - ATHSCL HEART DISEASE OF NATIVE CORONARY ARTERY W/O ANG PCTRS 

  





(6) Chest pain, rule out acute myocardial infarction


Code(s): R07.9 - CHEST PAIN, UNSPECIFIED   





(7) Diabetes


Code(s): E11.9 - TYPE 2 DIABETES MELLITUS WITHOUT COMPLICATIONS   


Qualifiers: 


   Diabetes mellitus type: type 2 





(8) HLD (hyperlipidemia)


Code(s): E78.5 - HYPERLIPIDEMIA, UNSPECIFIED   





(9) HTN (hypertension)


Code(s): I10 - ESSENTIAL (PRIMARY) HYPERTENSION   





(10) Hypothyroidism


Code(s): E03.9 - HYPOTHYROIDISM, UNSPECIFIED   





Assessment/Plan





CARDIAC ENZYMES/EKG R/O M.I.


CARDIOLOGY EVAL


CAN NOT HAVE NUCLEAR STRESS TEST B/C OF ALLERGY 


HAVE TO PRE-MEDICATE PRIOR AND CAN NOT WALK ON TREADMILL


B/C OF KNEE REPLACEMENT.


BGM CHECK WITH A1C/LIPID PANEL


DIARRHEA LIKELY VIRAL WILL MONITOR


STOOL FOR O AND P

## 2019-10-13 NOTE — DS
Physical Examination


Vital Signs: 


 Vital Signs











Temperature  98.5 F   10/13/19 06:58


 


Pulse Rate  72   10/13/19 06:58


 


Respiratory Rate  17   10/13/19 06:58


 


Blood Pressure  141/72   10/13/19 06:58


 


O2 Sat by Pulse Oximetry (%)  97   10/13/19 06:58











Constitutional: Yes: No Distress


Eyes: Yes: WNL


HENT: Yes: WNL


Neck: Yes: WNL


Cardiovascular: Yes: WNL


Respiratory: Yes: WNL


Gastrointestinal: Yes: WNL


Renal/: Yes: WNL


Musculoskeletal: Yes: WNL


Extremities: Yes: WNL


Edema: No


Labs: 


 CBC, BMP





 10/13/19 05:15 





 10/13/19 05:15 











Discharge Summary


Problems reviewed: Yes


Reason For Visit: ATYPICAL CHEST PAIN


Current Active Problems





Atypical chest pain (Acute)


Diarrhea (Acute)


Hypokalemia (Acute)


Nausea & vomiting (Acute)








Procedures: Principal: CARDIAC WORKUP


Hospital Course: 


ADMITTED FOR OBSERVATION AND MONITORED AND CARDIOLOGY CLEARED PATIENT TO GO HOME


Goals: 


FOLLOW UP WITH PMD


Condition: Improved





- Instructions


Diet, Activity, Other Instructions: 


BRAT DIET


 D/W PATIENT UNTIL DIARRHEA RESOLVED


Disposition: HOME





- Home Medications


Comprehensive Discharge Medication List: 


Ambulatory Orders





Amlodipine Besylate [Norvasc -] 10 mg PO DAILY 12/30/14 


Fluticasone Propionate [Flovent Diskus] 50 mcg IH BID PRN 01/16/15 


Levothyroxine [Synthroid -] 125 mcg PO DAILY 04/11/17 


Aspirin [Ecotrin] 81 mg PO DAILY 03/29/18 


Atorvastatin Ca [Lipitor] 10 mg PO HS 03/29/18 


Cholecalciferol (Vitamin D3) [Vitamin D3] 5,000 unit PO DAILY 03/29/18 


Losartan Potassium 100 mg PO DAILY 03/29/18 


Insulin Glargine,Hum.rec.anlog [Lantus] 45 unit SQ BID 09/09/19 


Ranolazine [Ranexa -] 500 mg PO BID #60 tab 09/10/19 


Insulin (Novolog) [Novolog -] 15 units SQ TID 10/13/19 








Prescription Drug Monitoring Program (I-STOP) results: I-STOP not reviewed

## 2019-10-13 NOTE — CON.CARD
Consult


Consult Specialty:: Cardiology


Referred by:: Diana


Reason for Consultation:: cp





- History of Present Illness


Chief Complaint: cp


History of Present Illness: 


54 year old female with PMHx of HTN, HLD, DM, CAD s/p MI with cath x2 (no 

stenting), GERD, and hypothyroidism arrived to  ED for chest pain x5 days. 

Patient reported her chest pain is left sided, pressure, intermittent, non-

radiating, aggravated by exertion, pain of 7/10. Patient also complain of pain 

neck, dizziness,  mild nausea without vomiting. Patient denies SOB, headache, 

no diarrhea or vomiting. 





Nuclear stress test 9/9/19: normal perfusion. apical thinning EF 63%.  








- History Source


History Provided By: Patient, Medical Record





- Past Medical History


Cardio/Vascular: Yes: CAD (s/p 2 cath), HTN, Hyperlipdemia, MI


Gastrointestinal: Yes: GERD


...LMP: 02/18/18


Endocrine: Yes: Diabetes Mellitus, Hypothyroidism





- Past Surgical History


Past Surgical History: Yes: Joint Replacement





- Alcohol/Substance Use


Hx Alcohol Use: No


History of Substance Use: reports: None





- Smoking History


Smoking history: Never smoked


Have you smoked in the past 12 months: No


Aproximately how many cigarettes per day: 0





- Social History


ADL: Independent


History of Recent Travel: No





Home Medications





- Allergies


Allergies/Adverse Reactions: 


 Allergies











Allergy/AdvReac Type Severity Reaction Status Date / Time


 


codeine phosphate Allergy Intermediate Rash Verified 10/12/19 20:44





[From Tylenol-Codeine #3]     














- Home Medications


Home Medications: 


Ambulatory Orders





Amlodipine Besylate [Norvasc -] 10 mg PO DAILY 12/30/14 


Fluticasone Propionate [Flovent Diskus] 50 mcg IH BID PRN 01/16/15 


Levothyroxine [Synthroid -] 125 mcg PO DAILY 04/11/17 


Aspirin [Ecotrin] 81 mg PO DAILY 03/29/18 


Atorvastatin Ca [Lipitor] 10 mg PO HS 03/29/18 


Cholecalciferol (Vitamin D3) [Vitamin D3] 5,000 unit PO DAILY 03/29/18 


Losartan Potassium 100 mg PO DAILY 03/29/18 


Insulin Glargine,Hum.rec.anlog [Lantus] 45 unit SQ BID 09/09/19 


Ranolazine [Ranexa -] 500 mg PO BID #60 tab 09/10/19 


Insulin (Novolog) [Novolog] 15 units SQ TID 10/13/19 








Vital Signs: 


 Vital Signs











Temperature  98.5 F   10/13/19 06:58


 


Pulse Rate  72   10/13/19 06:58


 


Respiratory Rate  17   10/13/19 06:58


 


Blood Pressure  141/72   10/13/19 06:58


 


O2 Sat by Pulse Oximetry (%)  97   10/13/19 06:58











Constitutional: Yes: No Distress, Calm


Eyes: Yes: Conjunctiva Clear, EOM Intact


HENT: Yes: Atraumatic, Normocephalic


Neck: Yes: Trachea Midline


Respiratory: Yes: CTA Bilaterally


Gastrointestinal: Yes: Normal Bowel Sounds, Soft


Cardiovascular: Yes: Regular Rate and Rhythm


JVD: No


Carotid Bruit: No


PMI: Non-Displaced


Heart Sounds: Yes: S1, S2


Musculoskeletal: Yes: WNL


Extremities: Yes: WNL


Edema: No


Peripheral Pulses WNL: Yes





- Other Data


Labs, Other Data: 


 CBC, BMP





 10/13/19 05:15 





 10/13/19 05:15 





 INR, PTT











INR  1.04  (0.83-1.09)   10/12/19  21:31    








 Troponin, BNP











  10/12/19 10/13/19





  21:31 01:09


 


Troponin I  < 0.02  < 0.02


 


B-Natriuretic Peptide  11.6 








 Troponin, BNP











  10/12/19 10/13/19





  21:31 01:09


 


Troponin I  < 0.02  < 0.02


 


B-Natriuretic Peptide  11.6 














Imaging





- Results


Chest X-ray: Report Reviewed (marsha)


EKG: Report Reviewed (nsr nssttw changes)





Assessment/Plan


54 year old female with PMHx of HTN, HLD, DM, CAD s/p MI with cath x2 (no 

stenting), GERD, and hypothyroidism arrived to  ED for chest pain x5 days. 

Patient reported her chest pain is left sided, pressure, intermittent, non-

radiating, aggravated by exertion, pain of 7/10. Patient also complain of pain 

neck, dizziness,  mild nausea without vomiting. Patient denies SOB, headache, 

no diarrhea or vomiting. 





Nuclear stress test 9/9/19: normal perfusion. apical thinning EF 63%.  





Plan


-no need for inpatient admit or workup. 


-her pain is atypical for angina. 


-will be happy to follow as outpatient. 


-dc home and fu with Dr Ortiz

## 2020-12-30 ENCOUNTER — HOSPITAL ENCOUNTER (EMERGENCY)
Dept: HOSPITAL 74 - JER | Age: 55
Discharge: LEFT BEFORE DISCHARGE | End: 2020-12-30
Payer: COMMERCIAL

## 2020-12-30 VITALS — SYSTOLIC BLOOD PRESSURE: 146 MMHG | DIASTOLIC BLOOD PRESSURE: 88 MMHG | TEMPERATURE: 98.3 F | HEART RATE: 91 BPM

## 2020-12-30 VITALS — BODY MASS INDEX: 29 KG/M2

## 2020-12-30 DIAGNOSIS — I24.9: Primary | ICD-10-CM

## 2020-12-30 LAB
ALBUMIN SERPL-MCNC: 4 G/DL (ref 3.4–5)
ALP SERPL-CCNC: 100 U/L (ref 45–117)
ALT SERPL-CCNC: 33 U/L (ref 13–61)
ANION GAP SERPL CALC-SCNC: 6 MMOL/L (ref 8–16)
AST SERPL-CCNC: 16 U/L (ref 15–37)
BASOPHILS # BLD: 0.6 % (ref 0–2)
BILIRUB SERPL-MCNC: 0.8 MG/DL (ref 0.2–1)
BUN SERPL-MCNC: 10.8 MG/DL (ref 7–18)
CALCIUM SERPL-MCNC: 9.3 MG/DL (ref 8.5–10.1)
CHLORIDE SERPL-SCNC: 106 MMOL/L (ref 98–107)
CO2 SERPL-SCNC: 28 MMOL/L (ref 21–32)
CREAT SERPL-MCNC: 0.6 MG/DL (ref 0.55–1.3)
DEPRECATED RDW RBC AUTO: 13.9 % (ref 11.6–15.6)
EOSINOPHIL # BLD: 0.9 % (ref 0–4.5)
GLUCOSE SERPL-MCNC: 154 MG/DL (ref 74–106)
HCT VFR BLD CALC: 41.1 % (ref 32.4–45.2)
HGB BLD-MCNC: 13.7 GM/DL (ref 10.7–15.3)
LYMPHOCYTES # BLD: 19 % (ref 8–40)
MCH RBC QN AUTO: 30.6 PG (ref 25.7–33.7)
MCHC RBC AUTO-ENTMCNC: 33.4 G/DL (ref 32–36)
MCV RBC: 91.7 FL (ref 80–96)
MONOCYTES # BLD AUTO: 6.9 % (ref 3.8–10.2)
NEUTROPHILS # BLD: 72.6 % (ref 42.8–82.8)
PLATELET # BLD AUTO: 220 K/MM3 (ref 134–434)
PMV BLD: 11.5 FL (ref 7.5–11.1)
PROT SERPL-MCNC: 7.9 G/DL (ref 6.4–8.2)
RBC # BLD AUTO: 4.48 M/MM3 (ref 3.6–5.2)
SODIUM SERPL-SCNC: 140 MMOL/L (ref 136–145)
WBC # BLD AUTO: 6.1 K/MM3 (ref 4–10)

## 2020-12-30 PROCEDURE — C9803 HOPD COVID-19 SPEC COLLECT: HCPCS

## 2020-12-30 PROCEDURE — U0003 INFECTIOUS AGENT DETECTION BY NUCLEIC ACID (DNA OR RNA); SEVERE ACUTE RESPIRATORY SYNDROME CORONAVIRUS 2 (SARS-COV-2) (CORONAVIRUS DISEASE [COVID-19]), AMPLIFIED PROBE TECHNIQUE, MAKING USE OF HIGH THROUGHPUT TECHNOLOGIES AS DESCRIBED BY CMS-2020-01-R: HCPCS

## 2021-03-07 ENCOUNTER — HOSPITAL ENCOUNTER (EMERGENCY)
Dept: HOSPITAL 74 - JER | Age: 56
Discharge: HOME | End: 2021-03-07
Payer: COMMERCIAL

## 2021-03-07 VITALS — SYSTOLIC BLOOD PRESSURE: 176 MMHG | HEART RATE: 107 BPM | DIASTOLIC BLOOD PRESSURE: 80 MMHG | TEMPERATURE: 99 F

## 2021-03-07 VITALS — BODY MASS INDEX: 29.8 KG/M2

## 2021-03-07 DIAGNOSIS — R50.9: ICD-10-CM

## 2021-03-07 DIAGNOSIS — R06.02: Primary | ICD-10-CM

## 2021-03-07 LAB
ALBUMIN SERPL-MCNC: 4 G/DL (ref 3.4–5)
ALP SERPL-CCNC: 102 U/L (ref 45–117)
ALT SERPL-CCNC: 66 U/L (ref 13–61)
ANION GAP SERPL CALC-SCNC: 4 MMOL/L (ref 8–16)
AST SERPL-CCNC: 31 U/L (ref 15–37)
BASOPHILS # BLD: 1.3 % (ref 0–2)
BILIRUB SERPL-MCNC: 1.1 MG/DL (ref 0.2–1)
BUN SERPL-MCNC: 11.9 MG/DL (ref 7–18)
CALCIUM SERPL-MCNC: 9.6 MG/DL (ref 8.5–10.1)
CHLORIDE SERPL-SCNC: 108 MMOL/L (ref 98–107)
CO2 SERPL-SCNC: 28 MMOL/L (ref 21–32)
CREAT SERPL-MCNC: 0.5 MG/DL (ref 0.55–1.3)
DEPRECATED RDW RBC AUTO: 13.9 % (ref 11.6–15.6)
EOSINOPHIL # BLD: 1 % (ref 0–4.5)
GLUCOSE SERPL-MCNC: 88 MG/DL (ref 74–106)
HCT VFR BLD CALC: 39.3 % (ref 32.4–45.2)
HGB BLD-MCNC: 13.4 GM/DL (ref 10.7–15.3)
LYMPHOCYTES # BLD: 40.1 % (ref 8–40)
MCH RBC QN AUTO: 31.2 PG (ref 25.7–33.7)
MCHC RBC AUTO-ENTMCNC: 34.2 G/DL (ref 32–36)
MCV RBC: 91.1 FL (ref 80–96)
MONOCYTES # BLD AUTO: 13.1 % (ref 3.8–10.2)
NEUTROPHILS # BLD: 44.5 % (ref 42.8–82.8)
PLATELET # BLD AUTO: 210 K/MM3 (ref 134–434)
PMV BLD: 10.9 FL (ref 7.5–11.1)
POTASSIUM SERPLBLD-SCNC: 3.7 MMOL/L (ref 3.5–5.1)
PROT SERPL-MCNC: 7.7 G/DL (ref 6.4–8.2)
RBC # BLD AUTO: 4.31 M/MM3 (ref 3.6–5.2)
SODIUM SERPL-SCNC: 139 MMOL/L (ref 136–145)
WBC # BLD AUTO: 5.2 K/MM3 (ref 4–10)

## 2021-03-07 PROCEDURE — U0003 INFECTIOUS AGENT DETECTION BY NUCLEIC ACID (DNA OR RNA); SEVERE ACUTE RESPIRATORY SYNDROME CORONAVIRUS 2 (SARS-COV-2) (CORONAVIRUS DISEASE [COVID-19]), AMPLIFIED PROBE TECHNIQUE, MAKING USE OF HIGH THROUGHPUT TECHNOLOGIES AS DESCRIBED BY CMS-2020-01-R: HCPCS

## 2021-03-07 PROCEDURE — C9803 HOPD COVID-19 SPEC COLLECT: HCPCS

## 2021-11-02 ENCOUNTER — HOSPITAL ENCOUNTER (EMERGENCY)
Dept: HOSPITAL 74 - JER | Age: 56
Discharge: HOME | End: 2021-11-02
Payer: COMMERCIAL

## 2021-11-02 VITALS — HEART RATE: 75 BPM | SYSTOLIC BLOOD PRESSURE: 142 MMHG | DIASTOLIC BLOOD PRESSURE: 82 MMHG | TEMPERATURE: 98.4 F

## 2021-11-02 VITALS — BODY MASS INDEX: 27.9 KG/M2

## 2021-11-02 DIAGNOSIS — R19.7: Primary | ICD-10-CM

## 2021-11-02 DIAGNOSIS — N89.8: ICD-10-CM

## 2021-11-02 DIAGNOSIS — R10.13: ICD-10-CM

## 2021-11-02 LAB
ALBUMIN SERPL-MCNC: 4 G/DL (ref 3.4–5)
ALP SERPL-CCNC: 101 U/L (ref 45–117)
ALT SERPL-CCNC: 30 U/L (ref 13–61)
ANION GAP SERPL CALC-SCNC: 5 MMOL/L (ref 8–16)
APPEARANCE UR: CLEAR
AST SERPL-CCNC: 17 U/L (ref 15–37)
BASOPHILS # BLD: 1.9 % (ref 0–2)
BILIRUB SERPL-MCNC: 1.5 MG/DL (ref 0.2–1)
BILIRUB UR STRIP.AUTO-MCNC: NEGATIVE MG/DL
BUN SERPL-MCNC: 9.1 MG/DL (ref 7–18)
CALCIUM SERPL-MCNC: 9.3 MG/DL (ref 8.5–10.1)
CHLORIDE SERPL-SCNC: 109 MMOL/L (ref 98–107)
CO2 SERPL-SCNC: 29 MMOL/L (ref 21–32)
COLOR UR: YELLOW
CREAT SERPL-MCNC: 0.7 MG/DL (ref 0.55–1.3)
DEPRECATED RDW RBC AUTO: 14.3 % (ref 11.6–15.6)
EOSINOPHIL # BLD: 1.6 % (ref 0–4.5)
GLUCOSE SERPL-MCNC: 120 MG/DL (ref 74–106)
HCT VFR BLD CALC: 44.7 % (ref 32.4–45.2)
HGB BLD-MCNC: 15 GM/DL (ref 10.7–15.3)
KETONES UR QL STRIP: NEGATIVE
LEUKOCYTE ESTERASE UR QL STRIP.AUTO: NEGATIVE
LIPASE SERPL-CCNC: 141 U/L (ref 73–393)
LYMPHOCYTES # BLD: 23.4 % (ref 8–40)
MCH RBC QN AUTO: 30.5 PG (ref 25.7–33.7)
MCHC RBC AUTO-ENTMCNC: 33.5 G/DL (ref 32–36)
MCV RBC: 91.2 FL (ref 80–96)
MONOCYTES # BLD AUTO: 6.3 % (ref 3.8–10.2)
NEUTROPHILS # BLD: 66.8 % (ref 42.8–82.8)
NITRITE UR QL STRIP: NEGATIVE
PH UR: 8 [PH] (ref 5–8)
PLATELET # BLD AUTO: 202 10^3/UL (ref 134–434)
PMV BLD: 10.2 FL (ref 7.5–11.1)
PROT SERPL-MCNC: 7.8 G/DL (ref 6.4–8.2)
PROT UR QL STRIP: (no result)
PROT UR QL STRIP: NEGATIVE
RBC # BLD AUTO: 4.91 M/MM3 (ref 3.6–5.2)
SODIUM SERPL-SCNC: 143 MMOL/L (ref 136–145)
SP GR UR: 1.03 (ref 1.01–1.03)
UROBILINOGEN UR STRIP-MCNC: 0.2 MG/DL (ref 0.2–1)
WBC # BLD AUTO: 5.2 K/MM3 (ref 4–10)

## 2021-11-09 ENCOUNTER — HOSPITAL ENCOUNTER (EMERGENCY)
Dept: HOSPITAL 74 - JER | Age: 56
Discharge: HOME | End: 2021-11-09
Payer: COMMERCIAL

## 2021-11-09 VITALS — HEART RATE: 89 BPM | DIASTOLIC BLOOD PRESSURE: 96 MMHG | SYSTOLIC BLOOD PRESSURE: 157 MMHG

## 2021-11-09 VITALS — TEMPERATURE: 97.9 F

## 2021-11-09 VITALS — BODY MASS INDEX: 25.8 KG/M2

## 2021-11-09 DIAGNOSIS — N30.00: ICD-10-CM

## 2021-11-09 DIAGNOSIS — R00.2: Primary | ICD-10-CM

## 2021-11-09 LAB
ALBUMIN SERPL-MCNC: 4.4 G/DL (ref 3.4–5)
ALP SERPL-CCNC: 115 U/L (ref 45–117)
ALT SERPL-CCNC: 31 U/L (ref 13–61)
ANION GAP SERPL CALC-SCNC: 6 MMOL/L (ref 8–16)
APPEARANCE UR: CLEAR
AST SERPL-CCNC: 13 U/L (ref 15–37)
BACTERIA # UR AUTO: 87 /UL (ref 0–1359)
BASOPHILS # BLD: 0.9 % (ref 0–2)
BILIRUB SERPL-MCNC: 0.5 MG/DL (ref 0.2–1)
BILIRUB UR STRIP.AUTO-MCNC: NEGATIVE MG/DL
BUN SERPL-MCNC: 13.4 MG/DL (ref 7–18)
CALCIUM SERPL-MCNC: 9.6 MG/DL (ref 8.5–10.1)
CASTS URNS QL MICRO: 1 /UL (ref 0–3.1)
CHLORIDE SERPL-SCNC: 105 MMOL/L (ref 98–107)
CO2 SERPL-SCNC: 28 MMOL/L (ref 21–32)
COLOR UR: YELLOW
CREAT SERPL-MCNC: 0.7 MG/DL (ref 0.55–1.3)
DEPRECATED RDW RBC AUTO: 13.9 % (ref 11.6–15.6)
EOSINOPHIL # BLD: 1 % (ref 0–4.5)
EPITH CASTS URNS QL MICRO: 27 /UL (ref 0–25.1)
GLUCOSE SERPL-MCNC: 89 MG/DL (ref 74–106)
HCT VFR BLD CALC: 43.2 % (ref 32.4–45.2)
HGB BLD-MCNC: 14.7 GM/DL (ref 10.7–15.3)
KETONES UR QL STRIP: NEGATIVE
LEUKOCYTE ESTERASE UR QL STRIP.AUTO: (no result)
LIPASE SERPL-CCNC: 213 U/L (ref 73–393)
LYMPHOCYTES # BLD: 30 % (ref 8–40)
MCH RBC QN AUTO: 30.8 PG (ref 25.7–33.7)
MCHC RBC AUTO-ENTMCNC: 34.1 G/DL (ref 32–36)
MCV RBC: 90.3 FL (ref 80–96)
MONOCYTES # BLD AUTO: 6.7 % (ref 3.8–10.2)
NEUTROPHILS # BLD: 61.4 % (ref 42.8–82.8)
NITRITE UR QL STRIP: NEGATIVE
PH UR: 7.5 [PH] (ref 5–8)
PLATELET # BLD AUTO: 226 10^3/UL (ref 134–434)
PMV BLD: 10.4 FL (ref 7.5–11.1)
PROT SERPL-MCNC: 8.6 G/DL (ref 6.4–8.2)
PROT UR QL STRIP: NEGATIVE
PROT UR QL STRIP: NEGATIVE
RBC # BLD AUTO: 4.78 M/MM3 (ref 3.6–5.2)
RBC # BLD AUTO: 8 /UL (ref 0–23.9)
SODIUM SERPL-SCNC: 138 MMOL/L (ref 136–145)
SP GR UR: 1.02 (ref 1.01–1.03)
UROBILINOGEN UR STRIP-MCNC: 1 MG/DL (ref 0.2–1)
WBC # BLD AUTO: 6.8 K/MM3 (ref 4–10)
WBC # UR AUTO: 38 /UL (ref 0–25.8)

## 2021-11-09 PROCEDURE — 3E033NZ INTRODUCTION OF ANALGESICS, HYPNOTICS, SEDATIVES INTO PERIPHERAL VEIN, PERCUTANEOUS APPROACH: ICD-10-PCS

## 2021-12-14 ENCOUNTER — HOSPITAL ENCOUNTER (OUTPATIENT)
Dept: HOSPITAL 74 - JER | Age: 56
Setting detail: OBSERVATION
LOS: 2 days | Discharge: HOME | End: 2021-12-16
Attending: FAMILY MEDICINE | Admitting: INTERNAL MEDICINE
Payer: COMMERCIAL

## 2021-12-14 VITALS — BODY MASS INDEX: 27.8 KG/M2

## 2021-12-14 DIAGNOSIS — Z96.9: ICD-10-CM

## 2021-12-14 DIAGNOSIS — K21.9: ICD-10-CM

## 2021-12-14 DIAGNOSIS — E03.9: ICD-10-CM

## 2021-12-14 DIAGNOSIS — I11.9: ICD-10-CM

## 2021-12-14 DIAGNOSIS — G82.20: ICD-10-CM

## 2021-12-14 DIAGNOSIS — I34.1: ICD-10-CM

## 2021-12-14 DIAGNOSIS — Z88.6: ICD-10-CM

## 2021-12-14 DIAGNOSIS — I49.3: ICD-10-CM

## 2021-12-14 DIAGNOSIS — E78.5: ICD-10-CM

## 2021-12-14 DIAGNOSIS — R30.0: ICD-10-CM

## 2021-12-14 DIAGNOSIS — I25.10: ICD-10-CM

## 2021-12-14 DIAGNOSIS — B37.9: ICD-10-CM

## 2021-12-14 DIAGNOSIS — Z88.8: ICD-10-CM

## 2021-12-14 DIAGNOSIS — J45.909: ICD-10-CM

## 2021-12-14 DIAGNOSIS — N39.0: Primary | ICD-10-CM

## 2021-12-14 LAB
ALBUMIN SERPL-MCNC: 4.2 G/DL (ref 3.4–5)
ALP SERPL-CCNC: 106 U/L (ref 45–117)
ALT SERPL-CCNC: 31 U/L (ref 13–61)
ANION GAP SERPL CALC-SCNC: 10 MMOL/L (ref 8–16)
APPEARANCE UR: (no result)
APTT BLD: 29.5 SECONDS (ref 25.2–36.5)
AST SERPL-CCNC: 15 U/L (ref 15–37)
BACTERIA # UR AUTO: 2937 /UL (ref 0–1359)
BASOPHILS # BLD: 0.8 % (ref 0–2)
BILIRUB SERPL-MCNC: 0.9 MG/DL (ref 0.2–1)
BILIRUB UR STRIP.AUTO-MCNC: NEGATIVE MG/DL
BUN SERPL-MCNC: 8.7 MG/DL (ref 7–18)
CALCIUM SERPL-MCNC: 9.5 MG/DL (ref 8.5–10.1)
CASTS URNS QL MICRO: 0 /UL (ref 0–3.1)
CHLORIDE SERPL-SCNC: 106 MMOL/L (ref 98–107)
CHOLEST SERPL-MCNC: 161 MG/DL (ref 50–200)
CO2 SERPL-SCNC: 27 MMOL/L (ref 21–32)
COLOR UR: YELLOW
CREAT SERPL-MCNC: 0.8 MG/DL (ref 0.55–1.3)
DEPRECATED RDW RBC AUTO: 13.8 % (ref 11.6–15.6)
EOSINOPHIL # BLD: 0.9 % (ref 0–4.5)
EPITH CASTS URNS QL MICRO: 14 /UL (ref 0–25.1)
GLUCOSE SERPL-MCNC: 156 MG/DL (ref 74–106)
HCT VFR BLD CALC: 43.1 % (ref 32.4–45.2)
HDLC SERPL-MCNC: 62 MG/DL (ref 40–60)
HGB BLD-MCNC: 14.5 GM/DL (ref 10.7–15.3)
INR BLD: 0.95 (ref 0.83–1.09)
KETONES UR QL STRIP: NEGATIVE
LDLC SERPL CALC-MCNC: 77 MG/DL (ref 5–100)
LEUKOCYTE ESTERASE UR QL STRIP.AUTO: (no result)
LYMPHOCYTES # BLD: 27 % (ref 8–40)
MCH RBC QN AUTO: 30.3 PG (ref 25.7–33.7)
MCHC RBC AUTO-ENTMCNC: 33.7 G/DL (ref 32–36)
MCV RBC: 90.1 FL (ref 80–96)
MONOCYTES # BLD AUTO: 6.4 % (ref 3.8–10.2)
NEUTROPHILS # BLD: 64.9 % (ref 42.8–82.8)
NITRITE UR QL STRIP: NEGATIVE
PH UR: 8 [PH] (ref 5–8)
PLATELET # BLD AUTO: 203 10^3/UL (ref 134–434)
PMV BLD: 10.4 FL (ref 7.5–11.1)
PROT SERPL-MCNC: 8.3 G/DL (ref 6.4–8.2)
PROT UR QL STRIP: NEGATIVE
PROT UR QL STRIP: NEGATIVE
PT PNL PPP: 11.1 SEC (ref 9.7–13)
RBC # BLD AUTO: 2 /UL (ref 0–23.9)
RBC # BLD AUTO: 4.79 M/MM3 (ref 3.6–5.2)
SODIUM SERPL-SCNC: 142 MMOL/L (ref 136–145)
SP GR UR: 1.01 (ref 1.01–1.03)
TRIGL SERPL-MCNC: 146 MG/DL (ref 0–150)
UROBILINOGEN UR STRIP-MCNC: 1 MG/DL (ref 0.2–1)
WBC # BLD AUTO: 6.1 K/MM3 (ref 4–10)
WBC # UR AUTO: 44 /UL (ref 0–25.8)

## 2021-12-14 PROCEDURE — U0005 INFEC AGEN DETEC AMPLI PROBE: HCPCS

## 2021-12-14 PROCEDURE — U0003 INFECTIOUS AGENT DETECTION BY NUCLEIC ACID (DNA OR RNA); SEVERE ACUTE RESPIRATORY SYNDROME CORONAVIRUS 2 (SARS-COV-2) (CORONAVIRUS DISEASE [COVID-19]), AMPLIFIED PROBE TECHNIQUE, MAKING USE OF HIGH THROUGHPUT TECHNOLOGIES AS DESCRIBED BY CMS-2020-01-R: HCPCS

## 2021-12-14 PROCEDURE — G0378 HOSPITAL OBSERVATION PER HR: HCPCS

## 2021-12-14 PROCEDURE — C9803 HOPD COVID-19 SPEC COLLECT: HCPCS

## 2021-12-15 PROCEDURE — 3E03329 INTRODUCTION OF OTHER ANTI-INFECTIVE INTO PERIPHERAL VEIN, PERCUTANEOUS APPROACH: ICD-10-PCS | Performed by: FAMILY MEDICINE

## 2021-12-15 PROCEDURE — 3E033NZ INTRODUCTION OF ANALGESICS, HYPNOTICS, SEDATIVES INTO PERIPHERAL VEIN, PERCUTANEOUS APPROACH: ICD-10-PCS | Performed by: FAMILY MEDICINE

## 2021-12-15 PROCEDURE — 3E023GC INTRODUCTION OF OTHER THERAPEUTIC SUBSTANCE INTO MUSCLE, PERCUTANEOUS APPROACH: ICD-10-PCS | Performed by: FAMILY MEDICINE

## 2021-12-15 RX ADMIN — PANTOPRAZOLE SODIUM SCH MG: 20 TABLET, DELAYED RELEASE ORAL at 10:11

## 2021-12-15 RX ADMIN — ENOXAPARIN SODIUM SCH MG: 40 INJECTION SUBCUTANEOUS at 10:10

## 2021-12-15 RX ADMIN — PIPERACILLIN SODIUM,TAZOBACTAM SODIUM SCH MLS/HR: 3; .375 INJECTION, POWDER, FOR SOLUTION INTRAVENOUS at 18:07

## 2021-12-15 RX ADMIN — INSULIN ASPART SCH: 100 INJECTION, SOLUTION INTRAVENOUS; SUBCUTANEOUS at 16:41

## 2021-12-15 RX ADMIN — LEVOTHYROXINE SODIUM SCH MCG: 100 TABLET ORAL at 08:02

## 2021-12-15 RX ADMIN — INSULIN ASPART SCH: 100 INJECTION, SOLUTION INTRAVENOUS; SUBCUTANEOUS at 07:52

## 2021-12-15 RX ADMIN — Medication SCH UNITS: at 10:11

## 2021-12-15 RX ADMIN — INSULIN ASPART SCH: 100 INJECTION, SOLUTION INTRAVENOUS; SUBCUTANEOUS at 22:38

## 2021-12-15 RX ADMIN — RANOLAZINE SCH MG: 500 TABLET, FILM COATED, EXTENDED RELEASE ORAL at 10:11

## 2021-12-15 RX ADMIN — INSULIN ASPART SCH UNITS: 100 INJECTION, SOLUTION INTRAVENOUS; SUBCUTANEOUS at 12:00

## 2021-12-15 RX ADMIN — MOMETASONE FUROATE SCH: 110 INHALANT RESPIRATORY (INHALATION) at 22:50

## 2021-12-15 RX ADMIN — MOMETASONE FUROATE SCH: 110 INHALANT RESPIRATORY (INHALATION) at 10:50

## 2021-12-15 RX ADMIN — Medication SCH: at 22:07

## 2021-12-15 RX ADMIN — RANOLAZINE SCH MG: 500 TABLET, FILM COATED, EXTENDED RELEASE ORAL at 22:35

## 2021-12-16 VITALS — HEART RATE: 88 BPM | DIASTOLIC BLOOD PRESSURE: 68 MMHG | SYSTOLIC BLOOD PRESSURE: 128 MMHG | TEMPERATURE: 98.5 F

## 2021-12-16 LAB
ANION GAP SERPL CALC-SCNC: 5 MMOL/L (ref 8–16)
BASOPHILS # BLD: 0.9 % (ref 0–2)
BUN SERPL-MCNC: 15.8 MG/DL (ref 7–18)
CALCIUM SERPL-MCNC: 9.5 MG/DL (ref 8.5–10.1)
CHLORIDE SERPL-SCNC: 108 MMOL/L (ref 98–107)
CO2 SERPL-SCNC: 28 MMOL/L (ref 21–32)
CREAT SERPL-MCNC: 0.6 MG/DL (ref 0.55–1.3)
DEPRECATED RDW RBC AUTO: 13.9 % (ref 11.6–15.6)
EOSINOPHIL # BLD: 1.5 % (ref 0–4.5)
GLUCOSE SERPL-MCNC: 155 MG/DL (ref 74–106)
HCT VFR BLD CALC: 40.7 % (ref 32.4–45.2)
HGB BLD-MCNC: 13.7 GM/DL (ref 10.7–15.3)
LYMPHOCYTES # BLD: 29.3 % (ref 8–40)
MAGNESIUM SERPL-MCNC: 2.4 MG/DL (ref 1.8–2.4)
MCH RBC QN AUTO: 30.8 PG (ref 25.7–33.7)
MCHC RBC AUTO-ENTMCNC: 33.8 G/DL (ref 32–36)
MCV RBC: 91.2 FL (ref 80–96)
MONOCYTES # BLD AUTO: 7.9 % (ref 3.8–10.2)
NEUTROPHILS # BLD: 60.4 % (ref 42.8–82.8)
PLATELET # BLD AUTO: 195 10^3/UL (ref 134–434)
PMV BLD: 10.3 FL (ref 7.5–11.1)
RBC # BLD AUTO: 4.46 M/MM3 (ref 3.6–5.2)
SODIUM SERPL-SCNC: 141 MMOL/L (ref 136–145)
WBC # BLD AUTO: 5.7 K/MM3 (ref 4–10)

## 2021-12-16 RX ADMIN — LEVOTHYROXINE SODIUM SCH MCG: 100 TABLET ORAL at 06:33

## 2021-12-16 RX ADMIN — PIPERACILLIN SODIUM,TAZOBACTAM SODIUM SCH MLS/HR: 3; .375 INJECTION, POWDER, FOR SOLUTION INTRAVENOUS at 09:56

## 2021-12-16 RX ADMIN — INSULIN ASPART SCH: 100 INJECTION, SOLUTION INTRAVENOUS; SUBCUTANEOUS at 11:28

## 2021-12-16 RX ADMIN — ENOXAPARIN SODIUM SCH MG: 40 INJECTION SUBCUTANEOUS at 09:54

## 2021-12-16 RX ADMIN — PIPERACILLIN SODIUM,TAZOBACTAM SODIUM SCH MLS/HR: 3; .375 INJECTION, POWDER, FOR SOLUTION INTRAVENOUS at 02:05

## 2021-12-16 RX ADMIN — MOMETASONE FUROATE SCH: 110 INHALANT RESPIRATORY (INHALATION) at 09:56

## 2021-12-16 RX ADMIN — PANTOPRAZOLE SODIUM SCH MG: 20 TABLET, DELAYED RELEASE ORAL at 09:54

## 2021-12-16 RX ADMIN — INSULIN ASPART SCH: 100 INJECTION, SOLUTION INTRAVENOUS; SUBCUTANEOUS at 16:36

## 2021-12-16 RX ADMIN — INSULIN ASPART SCH: 100 INJECTION, SOLUTION INTRAVENOUS; SUBCUTANEOUS at 06:36

## 2021-12-16 RX ADMIN — RANOLAZINE SCH MG: 500 TABLET, FILM COATED, EXTENDED RELEASE ORAL at 09:54

## 2021-12-16 RX ADMIN — Medication SCH UNITS: at 09:54

## 2021-12-28 ENCOUNTER — HOSPITAL ENCOUNTER (EMERGENCY)
Dept: HOSPITAL 74 - JER | Age: 56
Discharge: HOME | End: 2021-12-28
Payer: COMMERCIAL

## 2021-12-28 VITALS — HEART RATE: 92 BPM | TEMPERATURE: 98.2 F | DIASTOLIC BLOOD PRESSURE: 60 MMHG | SYSTOLIC BLOOD PRESSURE: 119 MMHG

## 2021-12-28 VITALS — BODY MASS INDEX: 28.8 KG/M2

## 2021-12-28 DIAGNOSIS — B34.9: Primary | ICD-10-CM

## 2021-12-28 PROCEDURE — C9803 HOPD COVID-19 SPEC COLLECT: HCPCS

## 2021-12-28 PROCEDURE — U0003 INFECTIOUS AGENT DETECTION BY NUCLEIC ACID (DNA OR RNA); SEVERE ACUTE RESPIRATORY SYNDROME CORONAVIRUS 2 (SARS-COV-2) (CORONAVIRUS DISEASE [COVID-19]), AMPLIFIED PROBE TECHNIQUE, MAKING USE OF HIGH THROUGHPUT TECHNOLOGIES AS DESCRIBED BY CMS-2020-01-R: HCPCS

## 2021-12-28 PROCEDURE — U0005 INFEC AGEN DETEC AMPLI PROBE: HCPCS

## 2022-03-29 ENCOUNTER — HOSPITAL ENCOUNTER (EMERGENCY)
Dept: HOSPITAL 74 - JERFT | Age: 57
Discharge: HOME | End: 2022-03-29
Payer: COMMERCIAL

## 2022-03-29 VITALS — SYSTOLIC BLOOD PRESSURE: 166 MMHG | TEMPERATURE: 98.1 F | DIASTOLIC BLOOD PRESSURE: 72 MMHG | HEART RATE: 85 BPM

## 2022-03-29 VITALS — BODY MASS INDEX: 29 KG/M2

## 2022-03-29 DIAGNOSIS — M25.561: Primary | ICD-10-CM

## 2022-04-01 ENCOUNTER — HOSPITAL ENCOUNTER (OUTPATIENT)
Dept: HOSPITAL 74 - JER | Age: 57
Setting detail: OBSERVATION
LOS: 1 days | Discharge: HOME | End: 2022-04-02
Attending: FAMILY MEDICINE | Admitting: HOSPITALIST
Payer: COMMERCIAL

## 2022-04-01 VITALS — BODY MASS INDEX: 29 KG/M2

## 2022-04-01 DIAGNOSIS — R53.1: ICD-10-CM

## 2022-04-01 DIAGNOSIS — I25.10: Primary | ICD-10-CM

## 2022-04-01 DIAGNOSIS — E78.5: ICD-10-CM

## 2022-04-01 DIAGNOSIS — E78.00: ICD-10-CM

## 2022-04-01 DIAGNOSIS — R00.2: ICD-10-CM

## 2022-04-01 DIAGNOSIS — R07.89: ICD-10-CM

## 2022-04-01 DIAGNOSIS — I16.0: ICD-10-CM

## 2022-04-01 DIAGNOSIS — Z88.2: ICD-10-CM

## 2022-04-01 DIAGNOSIS — E03.9: ICD-10-CM

## 2022-04-01 DIAGNOSIS — I25.2: ICD-10-CM

## 2022-04-01 DIAGNOSIS — Z88.6: ICD-10-CM

## 2022-04-01 DIAGNOSIS — I34.1: ICD-10-CM

## 2022-04-01 DIAGNOSIS — I11.9: ICD-10-CM

## 2022-04-01 PROCEDURE — G0378 HOSPITAL OBSERVATION PER HR: HCPCS

## 2022-04-01 PROCEDURE — U0005 INFEC AGEN DETEC AMPLI PROBE: HCPCS

## 2022-04-01 PROCEDURE — U0003 INFECTIOUS AGENT DETECTION BY NUCLEIC ACID (DNA OR RNA); SEVERE ACUTE RESPIRATORY SYNDROME CORONAVIRUS 2 (SARS-COV-2) (CORONAVIRUS DISEASE [COVID-19]), AMPLIFIED PROBE TECHNIQUE, MAKING USE OF HIGH THROUGHPUT TECHNOLOGIES AS DESCRIBED BY CMS-2020-01-R: HCPCS

## 2022-04-02 VITALS — SYSTOLIC BLOOD PRESSURE: 137 MMHG | TEMPERATURE: 98.5 F | HEART RATE: 103 BPM | DIASTOLIC BLOOD PRESSURE: 82 MMHG

## 2022-04-02 LAB
ALBUMIN SERPL-MCNC: 4.6 G/DL (ref 3.4–5)
ALP SERPL-CCNC: 99 U/L (ref 45–117)
ALT SERPL-CCNC: 27 U/L (ref 13–61)
ANION GAP SERPL CALC-SCNC: 6 MMOL/L (ref 8–16)
AST SERPL-CCNC: 13 U/L (ref 15–37)
BASOPHILS # BLD: 0.9 % (ref 0–2)
BILIRUB SERPL-MCNC: 1.4 MG/DL (ref 0.2–1)
BUN SERPL-MCNC: 11.4 MG/DL (ref 7–18)
CALCIUM SERPL-MCNC: 9.8 MG/DL (ref 8.5–10.1)
CHLORIDE SERPL-SCNC: 104 MMOL/L (ref 98–107)
CO2 SERPL-SCNC: 29 MMOL/L (ref 21–32)
CREAT SERPL-MCNC: 0.6 MG/DL (ref 0.55–1.3)
DEPRECATED RDW RBC AUTO: 13.6 % (ref 11.6–15.6)
EOSINOPHIL # BLD: 1.3 % (ref 0–4.5)
GLUCOSE SERPL-MCNC: 131 MG/DL (ref 74–106)
HCT VFR BLD CALC: 43.4 % (ref 32.4–45.2)
HGB BLD-MCNC: 15.1 GM/DL (ref 10.7–15.3)
LYMPHOCYTES # BLD: 33.2 % (ref 8–40)
MCH RBC QN AUTO: 31.9 PG (ref 25.7–33.7)
MCHC RBC AUTO-ENTMCNC: 34.8 G/DL (ref 32–36)
MCV RBC: 91.6 FL (ref 80–96)
MONOCYTES # BLD AUTO: 6.7 % (ref 3.8–10.2)
NEUTROPHILS # BLD: 57.9 % (ref 42.8–82.8)
PLATELET # BLD AUTO: 162 10^3/UL (ref 134–434)
PMV BLD: 10.9 FL (ref 7.5–11.1)
PROT SERPL-MCNC: 8.4 G/DL (ref 6.4–8.2)
RBC # BLD AUTO: 4.74 M/MM3 (ref 3.6–5.2)
SODIUM SERPL-SCNC: 139 MMOL/L (ref 136–145)
WBC # BLD AUTO: 6.3 K/MM3 (ref 4–10)

## 2022-04-02 PROCEDURE — 3E023GC INTRODUCTION OF OTHER THERAPEUTIC SUBSTANCE INTO MUSCLE, PERCUTANEOUS APPROACH: ICD-10-PCS | Performed by: FAMILY MEDICINE

## 2022-04-02 RX ADMIN — INSULIN ASPART SCH UNITS: 100 INJECTION, SOLUTION INTRAVENOUS; SUBCUTANEOUS at 09:53

## 2022-04-02 RX ADMIN — INSULIN ASPART SCH: 100 INJECTION, SOLUTION INTRAVENOUS; SUBCUTANEOUS at 12:51

## 2022-11-09 ENCOUNTER — HOSPITAL ENCOUNTER (EMERGENCY)
Dept: HOSPITAL 74 - JER | Age: 57
Discharge: HOME | End: 2022-11-09
Payer: COMMERCIAL

## 2022-11-09 VITALS
SYSTOLIC BLOOD PRESSURE: 126 MMHG | DIASTOLIC BLOOD PRESSURE: 72 MMHG | HEART RATE: 90 BPM | RESPIRATION RATE: 17 BRPM | TEMPERATURE: 97.7 F

## 2022-11-09 VITALS — BODY MASS INDEX: 29.8 KG/M2

## 2022-11-09 DIAGNOSIS — J02.9: ICD-10-CM

## 2022-11-09 DIAGNOSIS — R09.81: ICD-10-CM

## 2022-11-09 DIAGNOSIS — B97.4: ICD-10-CM

## 2022-11-09 DIAGNOSIS — R05.1: Primary | ICD-10-CM

## 2022-11-09 PROCEDURE — 3E0233Z INTRODUCTION OF ANTI-INFLAMMATORY INTO MUSCLE, PERCUTANEOUS APPROACH: ICD-10-PCS

## 2022-12-28 ENCOUNTER — HOSPITAL ENCOUNTER (EMERGENCY)
Dept: HOSPITAL 74 - JER | Age: 57
LOS: 1 days | Discharge: HOME | End: 2022-12-29
Payer: COMMERCIAL

## 2022-12-28 VITALS
DIASTOLIC BLOOD PRESSURE: 72 MMHG | RESPIRATION RATE: 18 BRPM | SYSTOLIC BLOOD PRESSURE: 146 MMHG | HEART RATE: 92 BPM | TEMPERATURE: 100.6 F

## 2022-12-28 VITALS — BODY MASS INDEX: 30.7 KG/M2

## 2022-12-28 DIAGNOSIS — U07.1: Primary | ICD-10-CM
